# Patient Record
Sex: FEMALE | Race: WHITE | Employment: OTHER | URBAN - METROPOLITAN AREA
[De-identification: names, ages, dates, MRNs, and addresses within clinical notes are randomized per-mention and may not be internally consistent; named-entity substitution may affect disease eponyms.]

---

## 2017-01-12 ENCOUNTER — OFFICE VISIT (OUTPATIENT)
Dept: HEMATOLOGY/ONCOLOGY | Facility: HOSPITAL | Age: 52
End: 2017-01-12
Attending: SPECIALIST
Payer: COMMERCIAL

## 2017-01-12 VITALS
BODY MASS INDEX: 26.09 KG/M2 | HEART RATE: 91 BPM | OXYGEN SATURATION: 99 % | TEMPERATURE: 98 F | HEIGHT: 66 IN | WEIGHT: 162.38 LBS | RESPIRATION RATE: 18 BRPM | SYSTOLIC BLOOD PRESSURE: 137 MMHG | DIASTOLIC BLOOD PRESSURE: 84 MMHG

## 2017-01-12 DIAGNOSIS — C50.311 BREAST CANCER OF LOWER-INNER QUADRANT OF RIGHT FEMALE BREAST (HCC): Primary | ICD-10-CM

## 2017-01-12 DIAGNOSIS — D05.12 DUCTAL CARCINOMA IN SITU (DCIS) OF LEFT BREAST: ICD-10-CM

## 2017-01-12 PROCEDURE — 99215 OFFICE O/P EST HI 40 MIN: CPT | Performed by: SPECIALIST

## 2017-01-12 NOTE — PROGRESS NOTES
[facility] Progress Note      Patient Name: Kory Marroquin   YOB: 1965  Medical Record Number: [id number]  Attending Physician: Yahir Moore M.D.      Date of Visit: 1/12/2017      Chief Complaint  Breast cancer, right sided of depression in preparation for therapy with tamoxifen which can worsen depression. On 08/15/2014 she began adjuvant endocrine therapy with tamoxifen.  In 06/2015 patient discontinued tamoxifen for complaints of muscle/joint pains and mental \"fogginess mouth daily. Disp:  Rfl:         Allergies   Ms. Tawanna Apodaca has No Known Allergies. Review of Systems   Constitutional  No fevers, chills, night sweats, excessive fatigue or weight loss. Endocrine  No hot flashes.   Respiratory  No dyspnea, cough, hem suggest starting with tamoxifen as any experienced adverse effects would quickly be reversed with discontinuation of the medication. Patient stated that she was not interested.     While patient's family history is not suspicious for a genetic breast cancer

## 2017-01-12 NOTE — PROGRESS NOTES
Referring Provider: Kamille Aguila MD    Additional Providers: Saúl Purdy MD    Reason for Referral:  Arline Hodgkin was referred for genetic counseling because of a personal history of bilateral breast cancer.   Ms. Olayinka Taveras is a 46year-old woman of multiple primary cancers in the some individual, or the same type of cancer or related cancers (e.g., breast and ovarian, colorectal and endometrial) in three or more individuals in the same lineage.   Mutations in the genes, BRCA1 and BRCA2, account for th and choices regarding surveillance, screening, and prevention. It was stressed that genetic testing detects the presence of a gene mutation, it does not detect the presence of cancer or predict when, or if, an individual will develop cancer.   If she were mammograms and MRI, ovarian cancer screening, chemoprevention, and prophylactic surgery. Men with a BRCA1/2 mutation should discuss clinical breast exams, digital rectal exam and prostate specific antigen screening with their physicians.  All medical manag

## 2017-01-12 NOTE — PROGRESS NOTES
Patient is here today for follow up with [facility] for breast cancer. Patient denies pain. Medication list and medical history were reviewed and updated.     Education Record    Learner:  Patient    Disease / Diagnosis: breast cancer    Barriers / Obed Hash

## 2017-01-23 PROBLEM — D05.12 DUCTAL CARCINOMA IN SITU (DCIS) OF LEFT BREAST: Status: ACTIVE | Noted: 2017-01-23

## 2017-01-27 PROCEDURE — 88305 TISSUE EXAM BY PATHOLOGIST: CPT | Performed by: INTERNAL MEDICINE

## 2017-01-31 ENCOUNTER — ANESTHESIA EVENT (OUTPATIENT)
Dept: SURGERY | Facility: HOSPITAL | Age: 52
DRG: 583 | End: 2017-01-31
Payer: COMMERCIAL

## 2017-02-15 ENCOUNTER — SURGERY (OUTPATIENT)
Age: 52
End: 2017-02-15

## 2017-02-15 ENCOUNTER — APPOINTMENT (OUTPATIENT)
Dept: GENERAL RADIOLOGY | Facility: HOSPITAL | Age: 52
DRG: 583 | End: 2017-02-15
Attending: PLASTIC SURGERY
Payer: COMMERCIAL

## 2017-02-15 ENCOUNTER — HOSPITAL ENCOUNTER (INPATIENT)
Facility: HOSPITAL | Age: 52
LOS: 3 days | Discharge: HOME | DRG: 583 | End: 2017-02-18
Attending: SURGERY | Admitting: SURGERY
Payer: COMMERCIAL

## 2017-02-15 ENCOUNTER — ANESTHESIA (OUTPATIENT)
Dept: SURGERY | Facility: HOSPITAL | Age: 52
DRG: 583 | End: 2017-02-15
Payer: COMMERCIAL

## 2017-02-15 PROBLEM — C50.919 BREAST CANCER (HCC): Status: ACTIVE | Noted: 2017-02-15

## 2017-02-15 LAB
POCT LOT NUMBER: NORMAL
POCT URINE PREGNANCY: NEGATIVE

## 2017-02-15 PROCEDURE — 71010 XR CHEST AP PORTABLE  (CPT=71010): CPT

## 2017-02-15 PROCEDURE — 88304 TISSUE EXAM BY PATHOLOGIST: CPT | Performed by: SURGERY

## 2017-02-15 PROCEDURE — P9045 ALBUMIN (HUMAN), 5%, 250 ML: HCPCS

## 2017-02-15 PROCEDURE — 0HHV0NZ INSERTION OF TISSUE EXPANDER INTO BILATERAL BREAST, OPEN APPROACH: ICD-10-PCS | Performed by: PLASTIC SURGERY

## 2017-02-15 PROCEDURE — 88307 TISSUE EXAM BY PATHOLOGIST: CPT | Performed by: SURGERY

## 2017-02-15 PROCEDURE — 81025 URINE PREGNANCY TEST: CPT | Performed by: SURGERY

## 2017-02-15 PROCEDURE — 0KXF0ZZ TRANSFER RIGHT TRUNK MUSCLE, OPEN APPROACH: ICD-10-PCS | Performed by: PLASTIC SURGERY

## 2017-02-15 PROCEDURE — 0HTV0ZZ RESECTION OF BILATERAL BREAST, OPEN APPROACH: ICD-10-PCS | Performed by: SURGERY

## 2017-02-15 PROCEDURE — 0HRT075 REPLACEMENT OF RIGHT BREAST USING LATISSIMUS DORSI MYOCUTANEOUS FLAP, OPEN APPROACH: ICD-10-PCS | Performed by: PLASTIC SURGERY

## 2017-02-15 DEVICE — GRAFT ALLODERM CONTOUR MED PRF: Type: IMPLANTABLE DEVICE | Site: BREAST | Status: FUNCTIONAL

## 2017-02-15 DEVICE — IMPLANTABLE DEVICE: Type: IMPLANTABLE DEVICE | Site: BREAST | Status: FUNCTIONAL

## 2017-02-15 RX ORDER — KETOROLAC TROMETHAMINE 15 MG/ML
15 INJECTION, SOLUTION INTRAMUSCULAR; INTRAVENOUS EVERY 6 HOURS PRN
Status: ACTIVE | OUTPATIENT
Start: 2017-02-15 | End: 2017-02-17

## 2017-02-15 RX ORDER — HEPARIN SODIUM 5000 [USP'U]/ML
5000 INJECTION, SOLUTION INTRAVENOUS; SUBCUTANEOUS EVERY 8 HOURS
Status: DISCONTINUED | OUTPATIENT
Start: 2017-02-15 | End: 2017-02-18

## 2017-02-15 RX ORDER — OXYCODONE HYDROCHLORIDE 5 MG/1
5 TABLET ORAL EVERY 4 HOURS PRN
Status: DISCONTINUED | OUTPATIENT
Start: 2017-02-15 | End: 2017-02-18

## 2017-02-15 RX ORDER — ROSUVASTATIN CALCIUM 5 MG/1
5 TABLET, COATED ORAL NIGHTLY
Status: DISCONTINUED | OUTPATIENT
Start: 2017-02-15 | End: 2017-02-18

## 2017-02-15 RX ORDER — HYDROMORPHONE HYDROCHLORIDE 1 MG/ML
0.5 INJECTION, SOLUTION INTRAMUSCULAR; INTRAVENOUS; SUBCUTANEOUS EVERY 2 HOUR PRN
Status: DISCONTINUED | OUTPATIENT
Start: 2017-02-15 | End: 2017-02-18

## 2017-02-15 RX ORDER — SODIUM CHLORIDE, SODIUM LACTATE, POTASSIUM CHLORIDE, CALCIUM CHLORIDE 600; 310; 30; 20 MG/100ML; MG/100ML; MG/100ML; MG/100ML
INJECTION, SOLUTION INTRAVENOUS CONTINUOUS
Status: DISCONTINUED | OUTPATIENT
Start: 2017-02-15 | End: 2017-02-16

## 2017-02-15 RX ORDER — ACETAMINOPHEN 10 MG/ML
INJECTION, SOLUTION INTRAVENOUS
Status: DISCONTINUED | OUTPATIENT
Start: 2017-02-15 | End: 2017-02-15 | Stop reason: HOSPADM

## 2017-02-15 RX ORDER — BUPIVACAINE HYDROCHLORIDE 5 MG/ML
INJECTION, SOLUTION EPIDURAL; INTRACAUDAL AS NEEDED
Status: DISCONTINUED | OUTPATIENT
Start: 2017-02-15 | End: 2017-02-15 | Stop reason: HOSPADM

## 2017-02-15 RX ORDER — MIDAZOLAM HYDROCHLORIDE 1 MG/ML
1 INJECTION INTRAMUSCULAR; INTRAVENOUS ONCE
Status: DISCONTINUED | OUTPATIENT
Start: 2017-02-15 | End: 2017-02-15 | Stop reason: HOSPADM

## 2017-02-15 RX ORDER — SCOLOPAMINE TRANSDERMAL SYSTEM 1 MG/1
1 PATCH, EXTENDED RELEASE TRANSDERMAL ONCE
Status: DISCONTINUED | OUTPATIENT
Start: 2017-02-15 | End: 2017-02-15

## 2017-02-15 RX ORDER — SCOLOPAMINE TRANSDERMAL SYSTEM 1 MG/1
PATCH, EXTENDED RELEASE TRANSDERMAL
Status: DISPENSED
Start: 2017-02-15 | End: 2017-02-15

## 2017-02-15 RX ORDER — METOCLOPRAMIDE HYDROCHLORIDE 5 MG/ML
10 INJECTION INTRAMUSCULAR; INTRAVENOUS AS NEEDED
Status: DISCONTINUED | OUTPATIENT
Start: 2017-02-15 | End: 2017-02-15 | Stop reason: HOSPADM

## 2017-02-15 RX ORDER — ONDANSETRON 2 MG/ML
4 INJECTION INTRAMUSCULAR; INTRAVENOUS EVERY 6 HOURS PRN
Status: DISCONTINUED | OUTPATIENT
Start: 2017-02-15 | End: 2017-02-15 | Stop reason: HOSPADM

## 2017-02-15 RX ORDER — MEPERIDINE HYDROCHLORIDE 25 MG/ML
12.5 INJECTION INTRAMUSCULAR; INTRAVENOUS; SUBCUTANEOUS AS NEEDED
Status: DISCONTINUED | OUTPATIENT
Start: 2017-02-15 | End: 2017-02-15 | Stop reason: HOSPADM

## 2017-02-15 RX ORDER — ACETAMINOPHEN 325 MG/1
650 TABLET ORAL EVERY 6 HOURS
Status: DISCONTINUED | OUTPATIENT
Start: 2017-02-15 | End: 2017-02-15

## 2017-02-15 RX ORDER — ONDANSETRON 2 MG/ML
4 INJECTION INTRAMUSCULAR; INTRAVENOUS EVERY 6 HOURS PRN
Status: ACTIVE | OUTPATIENT
Start: 2017-02-15 | End: 2017-02-17

## 2017-02-15 RX ORDER — HYDROMORPHONE HYDROCHLORIDE 1 MG/ML
1 INJECTION, SOLUTION INTRAMUSCULAR; INTRAVENOUS; SUBCUTANEOUS EVERY 2 HOUR PRN
Status: DISCONTINUED | OUTPATIENT
Start: 2017-02-15 | End: 2017-02-18

## 2017-02-15 RX ORDER — DEXTROSE, SODIUM CHLORIDE, SODIUM LACTATE, POTASSIUM CHLORIDE, AND CALCIUM CHLORIDE 5; .6; .31; .03; .02 G/100ML; G/100ML; G/100ML; G/100ML; G/100ML
INJECTION, SOLUTION INTRAVENOUS CONTINUOUS
Status: DISCONTINUED | OUTPATIENT
Start: 2017-02-15 | End: 2017-02-16

## 2017-02-15 RX ORDER — MIDAZOLAM HYDROCHLORIDE 5 MG/ML
INJECTION INTRAMUSCULAR; INTRAVENOUS
Status: DISCONTINUED
Start: 2017-02-15 | End: 2017-02-15 | Stop reason: WASHOUT

## 2017-02-15 RX ORDER — ACETAMINOPHEN 325 MG/1
650 TABLET ORAL EVERY 4 HOURS PRN
Status: DISCONTINUED | OUTPATIENT
Start: 2017-02-15 | End: 2017-02-18

## 2017-02-15 RX ORDER — LEVOTHYROXINE SODIUM 88 UG/1
88 TABLET ORAL DAILY
Status: DISCONTINUED | OUTPATIENT
Start: 2017-02-16 | End: 2017-02-18

## 2017-02-15 RX ORDER — NALOXONE HYDROCHLORIDE 0.4 MG/ML
80 INJECTION, SOLUTION INTRAMUSCULAR; INTRAVENOUS; SUBCUTANEOUS AS NEEDED
Status: DISCONTINUED | OUTPATIENT
Start: 2017-02-15 | End: 2017-02-15 | Stop reason: HOSPADM

## 2017-02-15 RX ORDER — SCOLOPAMINE TRANSDERMAL SYSTEM 1 MG/1
1 PATCH, EXTENDED RELEASE TRANSDERMAL
Status: DISCONTINUED | OUTPATIENT
Start: 2017-02-15 | End: 2017-02-15

## 2017-02-15 RX ORDER — KETOROLAC TROMETHAMINE 30 MG/ML
30 INJECTION, SOLUTION INTRAMUSCULAR; INTRAVENOUS EVERY 6 HOURS PRN
Status: DISPENSED | OUTPATIENT
Start: 2017-02-15 | End: 2017-02-17

## 2017-02-15 RX ORDER — DOCUSATE SODIUM 100 MG/1
100 CAPSULE, LIQUID FILLED ORAL DAILY
Status: DISCONTINUED | OUTPATIENT
Start: 2017-02-15 | End: 2017-02-18

## 2017-02-15 RX ORDER — PAROXETINE HYDROCHLORIDE 20 MG/1
20 TABLET, FILM COATED ORAL DAILY
Status: DISCONTINUED | OUTPATIENT
Start: 2017-02-16 | End: 2017-02-18

## 2017-02-15 RX ORDER — OXYCODONE HYDROCHLORIDE 5 MG/1
10 TABLET ORAL EVERY 4 HOURS PRN
Status: DISCONTINUED | OUTPATIENT
Start: 2017-02-15 | End: 2017-02-18

## 2017-02-15 RX ORDER — HYDROMORPHONE HYDROCHLORIDE 1 MG/ML
0.5 INJECTION, SOLUTION INTRAMUSCULAR; INTRAVENOUS; SUBCUTANEOUS EVERY 5 MIN PRN
Status: DISCONTINUED | OUTPATIENT
Start: 2017-02-15 | End: 2017-02-15 | Stop reason: HOSPADM

## 2017-02-15 RX ORDER — METOCLOPRAMIDE HYDROCHLORIDE 5 MG/ML
10 INJECTION INTRAMUSCULAR; INTRAVENOUS EVERY 6 HOURS PRN
Status: DISCONTINUED | OUTPATIENT
Start: 2017-02-15 | End: 2017-02-18

## 2017-02-15 RX ORDER — ONDANSETRON 2 MG/ML
4 INJECTION INTRAMUSCULAR; INTRAVENOUS AS NEEDED
Status: DISCONTINUED | OUTPATIENT
Start: 2017-02-15 | End: 2017-02-15 | Stop reason: HOSPADM

## 2017-02-15 RX ORDER — HYDROMORPHONE HYDROCHLORIDE 1 MG/ML
INJECTION, SOLUTION INTRAMUSCULAR; INTRAVENOUS; SUBCUTANEOUS
Status: COMPLETED
Start: 2017-02-15 | End: 2017-02-15

## 2017-02-15 RX ORDER — DIPHENHYDRAMINE HYDROCHLORIDE 50 MG/ML
12.5 INJECTION INTRAMUSCULAR; INTRAVENOUS AS NEEDED
Status: DISCONTINUED | OUTPATIENT
Start: 2017-02-15 | End: 2017-02-15 | Stop reason: HOSPADM

## 2017-02-15 RX ORDER — CEFAZOLIN SODIUM 1 G/3ML
INJECTION, POWDER, FOR SOLUTION INTRAMUSCULAR; INTRAVENOUS
Status: DISCONTINUED | OUTPATIENT
Start: 2017-02-15 | End: 2017-02-15

## 2017-02-15 RX ORDER — SODIUM CHLORIDE, SODIUM LACTATE, POTASSIUM CHLORIDE, CALCIUM CHLORIDE 600; 310; 30; 20 MG/100ML; MG/100ML; MG/100ML; MG/100ML
INJECTION, SOLUTION INTRAVENOUS CONTINUOUS
Status: DISCONTINUED | OUTPATIENT
Start: 2017-02-15 | End: 2017-02-15

## 2017-02-15 NOTE — BRIEF OP NOTE
[facility] SURGERY  Brief Op Note     [facility] Location: OR   CSN [id number] MRN [id number]   Admission Date 2/15/2017 Operation Date 2/15/2017   Attending Physician Satinder Duval MD Operating Physician Elizabeth Gayle MD       Pre-Operative Ishan Felix

## 2017-02-15 NOTE — OPERATIVE REPORT
PREOPERATIVE DIAGNOSIS:   Bilateral breast malignancy requiring mastectomy. POSTOPERATIVE DIAGNOSIS:   Same   OPERATION/PROCEDURE:   Immediate reconstruction of right breast with latissimus dorsi myocutaneous flap and  placement of expander.    Immediate Cedric performed bilateral mastectomies which will be dictated as a separate operative procedure. Upon Dr. Pulido Ba completion, I entered the operating room and began dissecting the left subpectoral pocket.  The subpectoral pocket was dissected sharply with e the prone position. The back was prepped and draped in the usual sterile fashion. We started the elevation of the flap by incision of the skin paddle on the back, taken down through the subcutaneous tissue to the level of the deep fat overlying the muscle. the sub-latissimus plane in adequate orientation and location. No saline was introduced. A 15 Nigerien mateo drain was introduced. Then, the incisions were closed in layers with 3-0 monocryl deep dermal sutures and 4-0 monocryl subcuticular sutures.   Sterile

## 2017-02-15 NOTE — ANESTHESIA PREPROCEDURE EVALUATION
PRE-OP EVALUATION    Patient Name: Angelique Zurita    Pre-op Diagnosis: HX OF BREAST CANCER, LEFT BREAST DUCTAL CARCINOMA IN SITU     Procedure(s):  BILATERAL MASTECTOMY (GREEN)  RIGHT BREAST TISSUE EXPANDER PLACEMENT AND LATISSIMUS FLAP, LEFT BREAST T MCG Oral Tab Take 88 mcg by mouth daily. Disp:  Rfl:        Allergies: Review of patient's allergies indicates no known allergies. Anesthesia Evaluation    Patient summary reviewed.     Anesthetic Complications  (-) history of anesthetic complications Comment Procedure: CERVICAL FACET INJECTION;  Surgeon: Annika Brown MD;  Location: [facility] MAIN OR     Right 2/9/2016    Comment Procedure: CERVICAL FACET INJECTION;  Surgeon: Annika Brown MD;  Location:  MAIN OR        Smoking status: Former Smoker

## 2017-02-15 NOTE — H&P
Pre-op Diagnosis: HX OF BREAST CANCER, LEFT BREAST DUCTAL CARCINOMA IN SITU     The above referenced H&P was reviewed by Edward Correa MD on 2/15/2017, the patient was examined and no significant changes have occurred in the patient's condition since the H

## 2017-02-15 NOTE — OPERATIVE REPORT
[facility]    PATIENT'S NAME: Sunshine Almeida   ATTENDING PHYSICIAN: Melita Kay M.D. OPERATING PHYSICIAN: Melita Kay M.D.    PATIENT ACCOUNT#:   [de-identified]    LOCATION:  OR  OR [place] ROOMS 4 EDWP 1000  MEDICAL RECORD #:   [id number]       DATE Michael Rosales with electrocautery and the LigaSure device. Once hemostasis was noted, a saline-soaked laparotomy pad was placed in the wound, and the right breast was done in a similar fashion.     Once the mastectomies were completed, the procedure was turned over to D

## 2017-02-15 NOTE — H&P
HPI:    Fernando Orozco is a 46year old female who presents for evaluation of DCIS left breast. Patient recently underwent resection of atypical ductal hyperplasia of her left breast with a focus of DCIS noted on pathology.  Margins were uni N/A  10/15/2015      Comment  Procedure: CERVICAL EPIDURAL;  Surgeon: Indigo Vines MD;  Location: [address] [place] [address] BY [facility] [address] [facility] [address]  [facility] 5+ YR  N/A  10/15/2015      Comment  Procedure: CERVICAL EPIDURAL;  Surgeon: Phan Crockett •  Diabetes  Father      •  Other[other] [OTHER]  Mother          COPD -  from pneumonia    •  Hypertension  Mother      •  Breast Cancer  Self  49    •  Other[other] [OTHER]  Sister            Smoking Status: Former Smoker                   Packs/Da

## 2017-02-15 NOTE — H&P
History of present illness: Ana Luisa Laura is a 46year old patient was recently diagnosed with Left DCIS. She has had a history of right breast cancer s/p lumpectomy and radiation in 2014.  She is opting for bilateral mastectomy with no future chemoth 14'      Comment  invasive ductal carcinoma      LUMPECTOMY RIGHT    4/14'      Comment  invasive ductal carcinoma      JOSEY NEEDLE LOCALIZATION W/ SPECIMEN 1 SITE LEFT    4/14'      Comment  ADH      REDUCTION LEFT    05'      REDUCTION RIGHT    05'        Years: 14         Quit date: 01/01/2008    Alcohol Use: Yes           0.0 oz/week       0 Standard drinks or equivalent per week       Comment: social    Physical Exam: There were no vitals filed for this visit.    There is no weight on file to calculate B healing, implant infection, implant exposure and poor cosmetic result. She understands that implants are not man made devices that requires monitoring with MRI 3 years after and every other year after that.  She understands that breast reconstruction is a m

## 2017-02-16 LAB
BUN BLD-MCNC: 17 MG/DL (ref 8–20)
CALCIUM BLD-MCNC: 8.6 MG/DL (ref 8.3–10.3)
CHLORIDE: 106 MMOL/L (ref 101–111)
CO2: 26 MMOL/L (ref 22–32)
CREAT BLD-MCNC: 0.84 MG/DL (ref 0.55–1.02)
ERYTHROCYTE [DISTWIDTH] IN BLOOD BY AUTOMATED COUNT: 13.8 % (ref 11.5–16)
GLUCOSE BLD-MCNC: 114 MG/DL (ref 70–99)
HCT VFR BLD AUTO: 31.6 % (ref 34–50)
HGB BLD-MCNC: 10.2 G/DL (ref 12–16)
MCH RBC QN AUTO: 31.5 PG (ref 27–33.2)
MCHC RBC AUTO-ENTMCNC: 32.3 G/DL (ref 31–37)
MCV RBC AUTO: 97.5 FL (ref 81–100)
PLATELET # BLD AUTO: 231 10(3)UL (ref 150–450)
POTASSIUM SERPL-SCNC: 4.3 MMOL/L (ref 3.6–5.1)
RBC # BLD AUTO: 3.24 X10(6)UL (ref 3.8–5.1)
RED CELL DISTRIBUTION WIDTH-SD: 49.5 FL (ref 35.1–46.3)
SODIUM SERPL-SCNC: 140 MMOL/L (ref 136–144)
WBC # BLD AUTO: 14.2 X10(3) UL (ref 4–13)

## 2017-02-16 PROCEDURE — 36415 COLL VENOUS BLD VENIPUNCTURE: CPT | Performed by: PLASTIC SURGERY

## 2017-02-16 PROCEDURE — 80048 BASIC METABOLIC PNL TOTAL CA: CPT | Performed by: PLASTIC SURGERY

## 2017-02-16 PROCEDURE — 85027 COMPLETE CBC AUTOMATED: CPT | Performed by: PLASTIC SURGERY

## 2017-02-16 NOTE — PLAN OF CARE
GENITOURINARY - ADULT    • Absence of urinary retention Progressing        PAIN - ADULT    • Verbalizes/displays adequate comfort level or patient's stated pain goal Progressing        RESPIRATORY - ADULT    • Achieves optimal ventilation and oxygenation P

## 2017-02-16 NOTE — PROGRESS NOTES
Plastic Surgery PN    No issues o/n  Pain controlled with IV pain meds  Tolerating breakfast  Able to use the bathroom      02/16/17  0819   BP: 95/65   Pulse: 91   Temp: 98.6 °F (37 °C)   Resp: 18     ALEXANDRA: L Breast: 60, R breast: 65,  Back: 115, 80 serosan

## 2017-02-16 NOTE — PROGRESS NOTES
Plastic Surgery PN    Patient is s/p bilateral mastectomy with R latissimus flap with TE and L TE reconstruction    A/P:  NO pressure in the right axillary area  Drain care  Pain control with PO oxycodone and IV breakthrough   Advance diet as tolerated  An

## 2017-02-16 NOTE — PROGRESS NOTES
[facility]  Progress Note    Magno Carr Patient Status:  Outpatient in a Bed    1965 MRN [id number]   [facility] 3NW-A Attending Maxime Mccann MD   Hosp Day # 1 PCP Janie Giraldo MD     Subjective:    Patient reports in plastics      D/W Dr Sumit Perry, [address]  General Surgery  2/16/2017

## 2017-02-16 NOTE — PAYOR COMM NOTE
Attending Physician: Khadra Farmer MD    Review Type: ADMISSION   Reviewer: Amanda Ratliff       Date: February 16, 2017 - 9:34 AM  Payor: [employer] ARNOLD POS/KASSI  Authorization Number: REF #[id number]  Admit date: 2/15/2017  8:56 AM       Direct for OR    Pre-Ope Dieter Guy RN      Heparin Sodium (Porcine) 5000 UNIT/ML injection 5,000 Units     Date Action Dose Route User    2/16/2017 0608 Given 5000 Units Subcutaneous (Right Lower Abdomen) Dieter Guy RN    2/15/2017 2220 Given 5000 Units [facility] 24HRS:  Labs Reviewed   BASIC METABOLIC PANEL (8) - Abnormal; Notable for the following:     Glucose 114 (*)     All other components within normal limits   CBC, PLATELET; NO DIFFERENTIAL - Abnormal; Notable for the following:     WBC 14.2 (*)     RBC 3.24

## 2017-02-16 NOTE — ANESTHESIA POSTPROCEDURE EVALUATION
[address] [facility] Patient Status:  Outpatient in a Bed   Age/Gender 46year old female MRN [id number]   Location [facility] Attending Remigio Bergman MD   Hosp Day # 0 PCP Adrian Pal MD       Anesthesia

## 2017-02-16 NOTE — BRIEF OP NOTE
[facility] UNIT  Brief Op Note     Ky Sour Location: OR   CSN [id number] MRN [id number]   Admission Date 2/15/2017 Operation Date 2/15/2017   Attending Physician Miguel Will MD Operating Physician Heidi Wilson MD

## 2017-02-17 RX ORDER — CEFADROXIL 500 MG/1
500 CAPSULE ORAL 2 TIMES DAILY
Qty: 14 CAPSULE | Refills: 0 | Status: SHIPPED | OUTPATIENT
Start: 2017-02-17 | End: 2017-02-27

## 2017-02-17 RX ORDER — PSEUDOEPHEDRINE HCL 30 MG
100 TABLET ORAL DAILY
Qty: 20 CAPSULE | Refills: 0 | Status: SHIPPED | OUTPATIENT
Start: 2017-02-17 | End: 2017-06-14 | Stop reason: ALTCHOICE

## 2017-02-17 RX ORDER — OXYCODONE HYDROCHLORIDE AND ACETAMINOPHEN 5; 325 MG/1; MG/1
2 TABLET ORAL EVERY 4 HOURS PRN
Qty: 40 TABLET | Refills: 1 | Status: SHIPPED | OUTPATIENT
Start: 2017-02-17 | End: 2017-02-24

## 2017-02-17 NOTE — PROGRESS NOTES
Plastic Surgery PN    No issues o/n  Pain controlled with PO meds  Tolerating breakfast  Able to use the bathroom   Ambulated without difficulty       02/17/17  1156   BP: 103/61   Pulse: 70   Temp: 98.2 °F (36.8 °C)   Resp: 16     UOP:650 cc  ALEXANDRA:     ALEXANDRA:

## 2017-02-17 NOTE — PLAN OF CARE
PAIN - ADULT    • Verbalizes/displays adequate comfort level or patient's stated pain goal Not Progressing          DISCHARGE PLANNING    • Discharge to home or other facility with appropriate resources Progressing        GENITOURINARY - ADULT    • Absence

## 2017-02-17 NOTE — PROGRESS NOTES
Dr Hubbard Coventry here to see pt.  md states ok to d/c home TOMORROW & she does not need to see pt prior to d/c.

## 2017-02-17 NOTE — PROGRESS NOTES
[facility]  Progress Note    Guillermina Backers Patient Status:  Observation    1965 MRN [id number]   [facility] 3NW-A Attending Arabella Ayala MD   Hosp Day # 2 PCP Kaylin Stoddard MD     Subjective:    Patient continues to repor

## 2017-02-17 NOTE — PLAN OF CARE
DISCHARGE PLANNING    • Discharge to home or other facility with appropriate resources Progressing        GENITOURINARY - ADULT    • Absence of urinary retention Progressing        PAIN - ADULT    • Verbalizes/displays adequate comfort level or patient's s

## 2017-02-17 NOTE — PLAN OF CARE
Problem: RESPIRATORY - ADULT  Goal: Achieves optimal ventilation and oxygenation  INTERVENTIONS:  - Assess for changes in respiratory status  - Assess for changes in mentation and behavior  - Position to facilitate oxygenation and minimize respiratory effo request assistance  - Assess pain using appropriate pain scale  - Administer analgesics based on type and severity of pain and evaluate response  - Implement non-pharmacological measures as appropriate and evaluate response  - Consider cultural and social

## 2017-02-18 VITALS
HEART RATE: 75 BPM | BODY MASS INDEX: 26.03 KG/M2 | DIASTOLIC BLOOD PRESSURE: 63 MMHG | RESPIRATION RATE: 16 BRPM | WEIGHT: 162 LBS | OXYGEN SATURATION: 100 % | TEMPERATURE: 98 F | HEIGHT: 66 IN | SYSTOLIC BLOOD PRESSURE: 117 MMHG

## 2017-02-18 RX ORDER — HYDROCODONE BITARTRATE AND ACETAMINOPHEN 5; 325 MG/1; MG/1
1-2 TABLET ORAL
Qty: 40 TABLET | Refills: 0 | Status: SHIPPED | OUTPATIENT
Start: 2017-02-18 | End: 2017-02-18

## 2017-02-18 NOTE — PLAN OF CARE
Problem: RESPIRATORY - ADULT  Goal: Achieves optimal ventilation and oxygenation  INTERVENTIONS:  - Assess for changes in respiratory status  - Assess for changes in mentation and behavior  - Position to facilitate oxygenation and minimize respiratory effo PAIN - ADULT  Goal: Verbalizes/displays adequate comfort level or patient’s stated pain goal  INTERVENTIONS:  - Encourage pt to monitor pain and request assistance  - Assess pain using appropriate pain scale  - Administer analgesics based on type and sever

## 2017-02-18 NOTE — PLAN OF CARE
PT A/O, 94% ON RA, DRESSING TO CHEST D/I, ALEXANDRA X 4 DRAINING SEROUSANGUINAOUS DRAINAGE, C/O OF SEVERE PAIN, TEARFUL, GIVEN OXY IR/DILAUDID WITH PARTIAL RELIEF, USING IS, SCDS ON, IV ANTIBIOTICS GIVEN, UP TO BATHROOM VOIDING. INSTRUCTED PT ON POC.    PAIN - DAVID

## 2017-02-18 NOTE — PROGRESS NOTES
rx for percocet writted by dr Sisi Perez noted on chart. rx for norco writted by dr johnston d/c. Pt states she is ready to go home.

## 2017-02-18 NOTE — PROGRESS NOTES
Dr johnston here & states ok to d/c home. md asked this rn to check in with dr Issa Yeager & update on poc & d/c plans.   Awaiting return call from dr Issa Yeager

## 2017-02-18 NOTE — PROGRESS NOTES
[facility]  Progress Note    Nery Martinez Patient Status:  Observation    1965 MRN [id number]   [facility] 3NW-A Attending Jessie Duggan MD   Hosp Day # 3 PCP Binh Long MD     Subjective:  Feels well, ready to go home

## 2017-02-18 NOTE — PROGRESS NOTES
Pt d/c home. D/c instructions given to pt including sydney drain care, surgical wound care, rx's for abx & percocet, review of all home meds, diet, hydration, activity restrictions & f/u care. Verbalized understanding. Left unit stable via w/c.

## 2017-02-20 NOTE — DISCHARGE SUMMARY
[facility]  Discharge Summary    Mayo Chapman Patient Status:  Observation    1965 MRN [id number]   [facility] 3NW-A Attending No att. providers found   Hosp Day # 3 PCP Sami Porter MD     Date of Admission: 2/15/2017 Normal, Disp-90 tablet, R-3    Omega-3 Fatty Acids (FISH OIL CONCENTRATE OR)  Take 1 tablet by mouth., Historical    Cholecalciferol (VITAMIN D3) 5000 units Oral Cap  Take 1 Units by mouth., Historical    Ascorbic Acid (VITAMIN C) 500 MG/5ML Oral Liquid  T

## 2017-02-23 NOTE — PAYOR COMM NOTE
Review Type: CONTINUED STAY  Reviewer: Judyann Dance     Date: February 23, 2017 - 2:26 PM  Payor: [facility] [employer] ARNOLD POS/KASSI  Authorization Number: [id number]  Admit date: 2/15/2017  8:56 AM        Date of Discharge: 2/18/2017 12:43 PM    Admitting Diagnosis: HX c/d/i. Dressing in place.  No fluid collection.      A/P: POD 2 s/p bilateral mastectomy and R breast Lat flap/TE and L breast TE  - ambulate    - drain teaching  - IS  - Plan to DC home tomorrow am  - Follow up with plastic surgery on Tuesday and Dr. Karla Angel

## 2017-06-19 ENCOUNTER — APPOINTMENT (OUTPATIENT)
Dept: LAB | Facility: HOSPITAL | Age: 52
End: 2017-06-19
Payer: COMMERCIAL

## 2017-06-19 DIAGNOSIS — Z85.3 PERSONAL HISTORY OF BREAST CANCER: ICD-10-CM

## 2017-06-19 PROCEDURE — 36415 COLL VENOUS BLD VENIPUNCTURE: CPT

## 2017-06-19 PROCEDURE — 80048 BASIC METABOLIC PNL TOTAL CA: CPT

## 2017-06-19 PROCEDURE — 93010 ELECTROCARDIOGRAM REPORT: CPT | Performed by: INTERNAL MEDICINE

## 2017-06-19 PROCEDURE — 93005 ELECTROCARDIOGRAM TRACING: CPT

## 2017-07-05 ENCOUNTER — ANESTHESIA EVENT (OUTPATIENT)
Dept: SURGERY | Facility: HOSPITAL | Age: 52
End: 2017-07-05
Payer: COMMERCIAL

## 2017-07-06 ENCOUNTER — ANESTHESIA (OUTPATIENT)
Dept: SURGERY | Facility: HOSPITAL | Age: 52
End: 2017-07-06
Payer: COMMERCIAL

## 2017-07-06 ENCOUNTER — SURGERY (OUTPATIENT)
Age: 52
End: 2017-07-06

## 2017-07-06 ENCOUNTER — HOSPITAL ENCOUNTER (OUTPATIENT)
Facility: HOSPITAL | Age: 52
Setting detail: OBSERVATION
Discharge: HOME | End: 2017-07-08
Attending: PLASTIC SURGERY | Admitting: PLASTIC SURGERY
Payer: COMMERCIAL

## 2017-07-06 DIAGNOSIS — Z85.3 PERSONAL HISTORY OF BREAST CANCER: Primary | ICD-10-CM

## 2017-07-06 LAB
POCT LOT NUMBER: NORMAL
POCT URINE PREGNANCY: NEGATIVE

## 2017-07-06 PROCEDURE — 0J083ZZ ALTERATION OF ABDOMEN SUBCUTANEOUS TISSUE AND FASCIA, PERCUTANEOUS APPROACH: ICD-10-PCS | Performed by: PLASTIC SURGERY

## 2017-07-06 PROCEDURE — 0J073ZZ ALTERATION OF BACK SUBCUTANEOUS TISSUE AND FASCIA, PERCUTANEOUS APPROACH: ICD-10-PCS | Performed by: PLASTIC SURGERY

## 2017-07-06 PROCEDURE — 0HPU0NZ REMOVAL OF TISSUE EXPANDER FROM LEFT BREAST, OPEN APPROACH: ICD-10-PCS | Performed by: PLASTIC SURGERY

## 2017-07-06 PROCEDURE — 88304 TISSUE EXAM BY PATHOLOGIST: CPT | Performed by: PLASTIC SURGERY

## 2017-07-06 PROCEDURE — 0HRV0JZ REPLACEMENT OF BILATERAL BREAST WITH SYNTHETIC SUBSTITUTE, OPEN APPROACH: ICD-10-PCS | Performed by: PLASTIC SURGERY

## 2017-07-06 PROCEDURE — 0HPT0NZ REMOVAL OF TISSUE EXPANDER FROM RIGHT BREAST, OPEN APPROACH: ICD-10-PCS | Performed by: PLASTIC SURGERY

## 2017-07-06 PROCEDURE — 81025 URINE PREGNANCY TEST: CPT | Performed by: PLASTIC SURGERY

## 2017-07-06 DEVICE — IMPLANTABLE DEVICE: Type: IMPLANTABLE DEVICE | Site: BREAST | Status: FUNCTIONAL

## 2017-07-06 RX ORDER — HYDROCODONE BITARTRATE AND ACETAMINOPHEN 5; 325 MG/1; MG/1
1 TABLET ORAL AS NEEDED
Status: DISCONTINUED | OUTPATIENT
Start: 2017-07-06 | End: 2017-07-06 | Stop reason: HOSPADM

## 2017-07-06 RX ORDER — HYDROMORPHONE HYDROCHLORIDE 1 MG/ML
INJECTION, SOLUTION INTRAMUSCULAR; INTRAVENOUS; SUBCUTANEOUS
Status: COMPLETED
Start: 2017-07-06 | End: 2017-07-06

## 2017-07-06 RX ORDER — LEVOTHYROXINE SODIUM 88 UG/1
88 TABLET ORAL
Status: DISCONTINUED | OUTPATIENT
Start: 2017-07-07 | End: 2017-07-08

## 2017-07-06 RX ORDER — BUPIVACAINE HYDROCHLORIDE AND EPINEPHRINE 5; 5 MG/ML; UG/ML
INJECTION, SOLUTION EPIDURAL; INTRACAUDAL; PERINEURAL AS NEEDED
Status: DISCONTINUED | OUTPATIENT
Start: 2017-07-06 | End: 2017-07-06 | Stop reason: HOSPADM

## 2017-07-06 RX ORDER — HYDROMORPHONE HYDROCHLORIDE 1 MG/ML
0.4 INJECTION, SOLUTION INTRAMUSCULAR; INTRAVENOUS; SUBCUTANEOUS EVERY 5 MIN PRN
Status: DISCONTINUED | OUTPATIENT
Start: 2017-07-06 | End: 2017-07-06 | Stop reason: HOSPADM

## 2017-07-06 RX ORDER — MAGNESIUM HYDROXIDE 1200 MG/15ML
LIQUID ORAL CONTINUOUS PRN
Status: DISCONTINUED | OUTPATIENT
Start: 2017-07-06 | End: 2017-07-06

## 2017-07-06 RX ORDER — DIPHENHYDRAMINE HCL 25 MG
25 CAPSULE ORAL NIGHTLY PRN
Status: DISCONTINUED | OUTPATIENT
Start: 2017-07-06 | End: 2017-07-08

## 2017-07-06 RX ORDER — DOCUSATE SODIUM 100 MG/1
100 CAPSULE, LIQUID FILLED ORAL 2 TIMES DAILY
Status: DISCONTINUED | OUTPATIENT
Start: 2017-07-06 | End: 2017-07-08

## 2017-07-06 RX ORDER — HYDROCODONE BITARTRATE AND ACETAMINOPHEN 5; 325 MG/1; MG/1
2 TABLET ORAL EVERY 4 HOURS PRN
Status: DISCONTINUED | OUTPATIENT
Start: 2017-07-06 | End: 2017-07-08

## 2017-07-06 RX ORDER — CEFAZOLIN SODIUM 1 G/3ML
INJECTION, POWDER, FOR SOLUTION INTRAMUSCULAR; INTRAVENOUS
Status: DISCONTINUED | OUTPATIENT
Start: 2017-07-06 | End: 2017-07-06 | Stop reason: HOSPADM

## 2017-07-06 RX ORDER — ONDANSETRON 2 MG/ML
4 INJECTION INTRAMUSCULAR; INTRAVENOUS EVERY 6 HOURS PRN
Status: ACTIVE | OUTPATIENT
Start: 2017-07-06 | End: 2017-07-07

## 2017-07-06 RX ORDER — ACETAMINOPHEN 325 MG/1
650 TABLET ORAL EVERY 4 HOURS PRN
Status: DISCONTINUED | OUTPATIENT
Start: 2017-07-06 | End: 2017-07-08

## 2017-07-06 RX ORDER — MIDAZOLAM HYDROCHLORIDE 1 MG/ML
1 INJECTION INTRAMUSCULAR; INTRAVENOUS EVERY 5 MIN PRN
Status: DISCONTINUED | OUTPATIENT
Start: 2017-07-06 | End: 2017-07-06 | Stop reason: HOSPADM

## 2017-07-06 RX ORDER — HYDROCODONE BITARTRATE AND ACETAMINOPHEN 5; 325 MG/1; MG/1
1 TABLET ORAL EVERY 4 HOURS PRN
Status: DISCONTINUED | OUTPATIENT
Start: 2017-07-06 | End: 2017-07-08

## 2017-07-06 RX ORDER — MEPERIDINE HYDROCHLORIDE 25 MG/ML
12.5 INJECTION INTRAMUSCULAR; INTRAVENOUS; SUBCUTANEOUS AS NEEDED
Status: DISCONTINUED | OUTPATIENT
Start: 2017-07-06 | End: 2017-07-06 | Stop reason: HOSPADM

## 2017-07-06 RX ORDER — NALOXONE HYDROCHLORIDE 0.4 MG/ML
80 INJECTION, SOLUTION INTRAMUSCULAR; INTRAVENOUS; SUBCUTANEOUS AS NEEDED
Status: DISCONTINUED | OUTPATIENT
Start: 2017-07-06 | End: 2017-07-06 | Stop reason: HOSPADM

## 2017-07-06 RX ORDER — HEPARIN SODIUM 5000 [USP'U]/ML
5000 INJECTION, SOLUTION INTRAVENOUS; SUBCUTANEOUS EVERY 12 HOURS SCHEDULED
Status: DISCONTINUED | OUTPATIENT
Start: 2017-07-06 | End: 2017-07-08

## 2017-07-06 RX ORDER — HYDROMORPHONE HYDROCHLORIDE 1 MG/ML
0.4 INJECTION, SOLUTION INTRAMUSCULAR; INTRAVENOUS; SUBCUTANEOUS
Status: DISCONTINUED | OUTPATIENT
Start: 2017-07-06 | End: 2017-07-08

## 2017-07-06 RX ORDER — SODIUM CHLORIDE, SODIUM LACTATE, POTASSIUM CHLORIDE, CALCIUM CHLORIDE 600; 310; 30; 20 MG/100ML; MG/100ML; MG/100ML; MG/100ML
INJECTION, SOLUTION INTRAVENOUS CONTINUOUS
Status: DISCONTINUED | OUTPATIENT
Start: 2017-07-06 | End: 2017-07-06

## 2017-07-06 RX ORDER — SODIUM CHLORIDE, SODIUM LACTATE, POTASSIUM CHLORIDE, CALCIUM CHLORIDE 600; 310; 30; 20 MG/100ML; MG/100ML; MG/100ML; MG/100ML
INJECTION, SOLUTION INTRAVENOUS CONTINUOUS
Status: DISCONTINUED | OUTPATIENT
Start: 2017-07-06 | End: 2017-07-08

## 2017-07-06 RX ORDER — METOCLOPRAMIDE HYDROCHLORIDE 5 MG/ML
10 INJECTION INTRAMUSCULAR; INTRAVENOUS AS NEEDED
Status: DISCONTINUED | OUTPATIENT
Start: 2017-07-06 | End: 2017-07-06 | Stop reason: HOSPADM

## 2017-07-06 RX ORDER — PAROXETINE HYDROCHLORIDE 20 MG/1
20 TABLET, FILM COATED ORAL DAILY
Status: DISCONTINUED | OUTPATIENT
Start: 2017-07-06 | End: 2017-07-08

## 2017-07-06 RX ORDER — ONDANSETRON 2 MG/ML
4 INJECTION INTRAMUSCULAR; INTRAVENOUS AS NEEDED
Status: DISCONTINUED | OUTPATIENT
Start: 2017-07-06 | End: 2017-07-06 | Stop reason: HOSPADM

## 2017-07-06 RX ORDER — BISACODYL 10 MG
10 SUPPOSITORY, RECTAL RECTAL
Status: DISCONTINUED | OUTPATIENT
Start: 2017-07-06 | End: 2017-07-08

## 2017-07-06 RX ORDER — METOCLOPRAMIDE HYDROCHLORIDE 5 MG/ML
10 INJECTION INTRAMUSCULAR; INTRAVENOUS EVERY 6 HOURS PRN
Status: DISCONTINUED | OUTPATIENT
Start: 2017-07-06 | End: 2017-07-08

## 2017-07-06 RX ORDER — HYDROCODONE BITARTRATE AND ACETAMINOPHEN 5; 325 MG/1; MG/1
2 TABLET ORAL AS NEEDED
Status: DISCONTINUED | OUTPATIENT
Start: 2017-07-06 | End: 2017-07-06 | Stop reason: HOSPADM

## 2017-07-06 NOTE — ANESTHESIA POSTPROCEDURE EVALUATION
[address] Patient Status:  Hospital Outpatient Surgery   Age/Gender 45year old female MRN [id number]   Location [address] [facility] Attending Torrey Murcia MD   [address] Day # 0 PCP Marylou Cardenas MD

## 2017-07-06 NOTE — OPERATIVE REPORT
Pre-Operative Diagnosis: ACQUIRED ABSENCE OF BILATERAL BREASTS     Post-Operative Diagnosis: ACQUIRED ABSENCE OF BILATERAL BREASTS     Procedure Performed:    Procedure(s):  BILATERAL SECOND STAGE BREAST RECONSTRUCTION WITH SILICONE IMPLANT Jessy Delgado Prior to the procedure the patient was preoperatively marked in the standing upright position. Both inframammary folds as well as the midline were marked. Contour abnormalities were marked over the left lateral breast. The areas of liposuction was marked. was performed down to the capsule, which was opened. The tissue expander was removed. A complete periprosthetic capulotomy was performed. The lateral capsule was closed with multiple interrupted 2-0 ethibond sutures.  The pocket was irrigated with copious used to dress the abdomen The patient tolerated the procedure well and there were no complications. She was discharged extubated to the recovery room in stable condition.

## 2017-07-06 NOTE — ANESTHESIA PREPROCEDURE EVALUATION
PRE-OP EVALUATION    Patient Name: Sofie Hunt    Pre-op Diagnosis: PERSONAL HISTORY OF BREAST CANCER    Procedure(s):  COSMETIC LIPOSUCTION TO ABDOMEN AND BILATERAL FLANKS, DOG EAR REVISION TO BACK, RECONSTRUCTIVE 2ND STAGE BREAST RECONSTRUCTION tolerance: good     MET: >4         (+) hyperlipidemia                                  Endo/Other    Negative endo/other ROS.       (+) hypothyroidism                       Pulmonary    Negative pulmonary ROS.                        Neuro/Psych    Negative localization  14': RADIATION RIGHT      Comment: invasive ductal carcinoma  05': REDUCTION LEFT  05': REDUCTION RIGHT  No date: TONSILLECTOMY     Smoking status: Former Smoker  1.00 Packs/day  For 15.00 Years     Quit date: 1/1/2008    Smokeless tobacco: N

## 2017-07-06 NOTE — H&P
Pre-op Diagnosis: PERSONAL HISTORY OF BREAST CANCER    The above referenced H&P was reviewed by Tyra Galeana MD on 7/6/2017, the patient was examined and no significant changes have occurred in the patient's condition since the H&P was performed.   ANDRES thomas

## 2017-07-07 RX ORDER — IBUPROFEN 600 MG/1
600 TABLET ORAL EVERY 6 HOURS PRN
Status: DISCONTINUED | OUTPATIENT
Start: 2017-07-07 | End: 2017-07-08

## 2017-07-07 NOTE — PROGRESS NOTES
Plastic Surgery PN    46 yo s/p bilateral second stage implant exchange, abdominal liposuction, fat grafting to left breast. + dizziness when ambulating. BP on the hypotensive side.  Tolerated po.      07/07/17  1233   BP: 97/56   Pulse: 71   Resp: 18   Tem

## 2017-07-07 NOTE — PLAN OF CARE
Problem: SKIN/TISSUE INTEGRITY - ADULT  Goal: Incision(s), wounds(s) or drain site(s) healing without S/S of infection  INTERVENTIONS:  - Assess and document risk factors for pressure ulcer development  - Assess and document skin integrity  - Assess and do

## 2017-07-07 NOTE — PROGRESS NOTES
NURSING ADMISSION NOTE      Patient admitted via Cart  Oriented to room. Safety precautions initiated. Bed in low position. Call light in reach. RECEIVED PT FROM PACU , ALERT AND ORIENT X 4 , ON O2 2L/NC , CPOX , ABD BINDER ON , SURGICAL BRA ON , SCD

## 2017-07-07 NOTE — PLAN OF CARE
PAIN - ADULT    • Verbalizes/displays adequate comfort level or patient's stated pain goal Progressing        SKIN/TISSUE INTEGRITY - ADULT    • Incision(s), wounds(s) or drain site(s) healing without S/S of infection Progressing        Pt taking liquids &

## 2017-07-08 VITALS
OXYGEN SATURATION: 99 % | RESPIRATION RATE: 16 BRPM | SYSTOLIC BLOOD PRESSURE: 128 MMHG | HEIGHT: 66 IN | BODY MASS INDEX: 26.75 KG/M2 | HEART RATE: 71 BPM | TEMPERATURE: 98 F | DIASTOLIC BLOOD PRESSURE: 64 MMHG | WEIGHT: 166.44 LBS

## 2017-07-08 NOTE — DISCHARGE SUMMARY
[facility]  Discharge Summary    Suze Kelly Patient Status:  Observation    1965 MRN [id number]   [facility] 3NW-A Attending Chris Cullen MD   Hosp Day # 0 PCP Olivia Cisneros MD     Date of Admission: 2017    Da Exam:    No tenderness, masses, or nipple abnormality   Abdomen:     Soft, non-tender, bowel sounds active all four quadrants,     no masses, no organomegaly   Genitalia:    Normal female without lesion, discharge or tenderness   Rectal:    Normal tone, no Tab  Take  by mouth. azithromycin (ZITHROMAX Z-TAMIKO) 250 MG Oral Tab  Take two tablets today, then one tablet daily for 4 more days  Qty: 6 tablet Refills: 0    PARoxetine HCl 20 MG Oral Tab  Take 1 tablet (20 mg total) by mouth daily.   Qty: 90 tablet Re

## 2017-07-08 NOTE — PROGRESS NOTES
Pt d/c home. D/c instructions given to pt including diet, hydration, activity, wound care & f/u care. Verbalized understanding of all instructions. Left unit stable via w/c.

## 2017-07-08 NOTE — PROGRESS NOTES
POD 2  S/p liposuction to abdomen and b/l flanks, dog ear revision to back, b/l breast impalnt exchange capsulotomies with fat grafting to L breast  Pecola Judi reports feeling well, pain controlled with PO Norco  Denies any further dizziness or lightheadednes

## 2017-07-08 NOTE — PLAN OF CARE
PAIN - ADULT    • Verbalizes/displays adequate comfort level or patient's stated pain goal Progressing        SKIN/TISSUE INTEGRITY - ADULT    • Incision(s), wounds(s) or drain site(s) healing without S/S of infection Progressing        POD # 1.   Gauze to

## 2017-07-12 NOTE — H&P
History of present illness: Nery Martinez is a 46year old patient was recently diagnosed with Left DCIS. She has had a history of right breast cancer s/p lumpectomy and radiation in 2014.  She is opting for bilateral mastectomy with no future chemoth Lumpectomy w/SLN bx; Left Exc of                ADH; all under needle localization  14': RADIATION RIGHT      Comment: invasive ductal carcinoma  05': REDUCTION LEFT  05': REDUCTION RIGHT  No date: TONSILLECTOMY    No current facility-administered medicati breast recon in place    A/P: OR for second stage recon

## 2017-11-13 ENCOUNTER — ANESTHESIA EVENT (OUTPATIENT)
Dept: SURGERY | Facility: HOSPITAL | Age: 52
End: 2017-11-13
Payer: COMMERCIAL

## 2017-11-13 ENCOUNTER — ANESTHESIA (OUTPATIENT)
Dept: SURGERY | Facility: HOSPITAL | Age: 52
End: 2017-11-13
Payer: COMMERCIAL

## 2017-11-13 ENCOUNTER — HOSPITAL ENCOUNTER (OUTPATIENT)
Facility: HOSPITAL | Age: 52
Setting detail: HOSPITAL OUTPATIENT SURGERY
Discharge: HOME | End: 2017-11-13
Attending: PLASTIC SURGERY | Admitting: PLASTIC SURGERY
Payer: COMMERCIAL

## 2017-11-13 ENCOUNTER — SURGERY (OUTPATIENT)
Age: 52
End: 2017-11-13

## 2017-11-13 VITALS
WEIGHT: 169.06 LBS | TEMPERATURE: 99 F | BODY MASS INDEX: 27.17 KG/M2 | HEIGHT: 66 IN | RESPIRATION RATE: 16 BRPM | DIASTOLIC BLOOD PRESSURE: 71 MMHG | SYSTOLIC BLOOD PRESSURE: 105 MMHG | OXYGEN SATURATION: 95 % | HEART RATE: 87 BPM

## 2017-11-13 DIAGNOSIS — Z85.3 PERSONAL HISTORY OF MALIGNANT NEOPLASM OF BREAST: ICD-10-CM

## 2017-11-13 PROCEDURE — 0HX5XZZ TRANSFER CHEST SKIN, EXTERNAL APPROACH: ICD-10-PCS | Performed by: PLASTIC SURGERY

## 2017-11-13 PROCEDURE — 0JD83ZZ EXTRACTION OF ABDOMEN SUBCUTANEOUS TISSUE AND FASCIA, PERCUTANEOUS APPROACH: ICD-10-PCS | Performed by: PLASTIC SURGERY

## 2017-11-13 PROCEDURE — 0HRV37Z REPLACEMENT OF BILATERAL BREAST WITH AUTOLOGOUS TISSUE SUBSTITUTE, PERCUTANEOUS APPROACH: ICD-10-PCS | Performed by: PLASTIC SURGERY

## 2017-11-13 PROCEDURE — 81025 URINE PREGNANCY TEST: CPT | Performed by: PLASTIC SURGERY

## 2017-11-13 RX ORDER — MIDAZOLAM HYDROCHLORIDE 1 MG/ML
1 INJECTION INTRAMUSCULAR; INTRAVENOUS EVERY 5 MIN PRN
Status: DISCONTINUED | OUTPATIENT
Start: 2017-11-13 | End: 2017-11-13

## 2017-11-13 RX ORDER — LIDOCAINE HYDROCHLORIDE AND EPINEPHRINE 10; 10 MG/ML; UG/ML
INJECTION, SOLUTION INFILTRATION; PERINEURAL AS NEEDED
Status: DISCONTINUED | OUTPATIENT
Start: 2017-11-13 | End: 2017-11-13 | Stop reason: HOSPADM

## 2017-11-13 RX ORDER — MEPERIDINE HYDROCHLORIDE 25 MG/ML
12.5 INJECTION INTRAMUSCULAR; INTRAVENOUS; SUBCUTANEOUS AS NEEDED
Status: DISCONTINUED | OUTPATIENT
Start: 2017-11-13 | End: 2017-11-13

## 2017-11-13 RX ORDER — SODIUM CHLORIDE, SODIUM LACTATE, POTASSIUM CHLORIDE, CALCIUM CHLORIDE 600; 310; 30; 20 MG/100ML; MG/100ML; MG/100ML; MG/100ML
INJECTION, SOLUTION INTRAVENOUS CONTINUOUS
Status: DISCONTINUED | OUTPATIENT
Start: 2017-11-13 | End: 2017-11-13

## 2017-11-13 RX ORDER — CEFAZOLIN SODIUM 1 G/3ML
INJECTION, POWDER, FOR SOLUTION INTRAMUSCULAR; INTRAVENOUS
Status: DISCONTINUED | OUTPATIENT
Start: 2017-11-13 | End: 2017-11-13 | Stop reason: HOSPADM

## 2017-11-13 RX ORDER — HYDROCODONE BITARTRATE AND ACETAMINOPHEN 5; 325 MG/1; MG/1
1 TABLET ORAL AS NEEDED
Status: COMPLETED | OUTPATIENT
Start: 2017-11-13 | End: 2017-11-13

## 2017-11-13 RX ORDER — HYDROMORPHONE HYDROCHLORIDE 1 MG/ML
INJECTION, SOLUTION INTRAMUSCULAR; INTRAVENOUS; SUBCUTANEOUS
Status: COMPLETED
Start: 2017-11-13 | End: 2017-11-13

## 2017-11-13 RX ORDER — METOCLOPRAMIDE HYDROCHLORIDE 5 MG/ML
10 INJECTION INTRAMUSCULAR; INTRAVENOUS AS NEEDED
Status: DISCONTINUED | OUTPATIENT
Start: 2017-11-13 | End: 2017-11-13

## 2017-11-13 RX ORDER — NALOXONE HYDROCHLORIDE 0.4 MG/ML
80 INJECTION, SOLUTION INTRAMUSCULAR; INTRAVENOUS; SUBCUTANEOUS AS NEEDED
Status: DISCONTINUED | OUTPATIENT
Start: 2017-11-13 | End: 2017-11-13

## 2017-11-13 RX ORDER — ONDANSETRON 2 MG/ML
4 INJECTION INTRAMUSCULAR; INTRAVENOUS AS NEEDED
Status: DISCONTINUED | OUTPATIENT
Start: 2017-11-13 | End: 2017-11-13

## 2017-11-13 RX ORDER — HYDROMORPHONE HYDROCHLORIDE 1 MG/ML
0.4 INJECTION, SOLUTION INTRAMUSCULAR; INTRAVENOUS; SUBCUTANEOUS EVERY 5 MIN PRN
Status: DISCONTINUED | OUTPATIENT
Start: 2017-11-13 | End: 2017-11-13

## 2017-11-13 RX ORDER — HYDROCODONE BITARTRATE AND ACETAMINOPHEN 5; 325 MG/1; MG/1
2 TABLET ORAL AS NEEDED
Status: COMPLETED | OUTPATIENT
Start: 2017-11-13 | End: 2017-11-13

## 2017-11-13 NOTE — BRIEF OP NOTE
Pre-Operative Diagnosis: Personal history of malignant neoplasm of breast [Z85.3]     Post-Operative Diagnosis: Personal history of malignant neoplasm of breast [Z85.3]     Procedure Performed:   Procedure(s):  FAT GRAFTING AND NIPPLE CREATION TO Katerina Solid

## 2017-11-13 NOTE — ANESTHESIA PREPROCEDURE EVALUATION
PRE-OP EVALUATION    Patient Name: Jonathon Ndiaye    Pre-op Diagnosis: Personal history of malignant neoplasm of breast [Z85.3]    Procedure(s):  FAT GRAFTING AND NIPPLE CREATION TO BILATERAL BREASTS      Surgeon(s) and Role:     Thelma Hess MD History:  3/10/14 [facility]: BX BREAST PERCUT W/DEVICE      Comment: Right breast, two sites  No date:       Comment: Times 1  10/15/2015: [facility]ERT[facility] & Griselda Avina NDL/CATH SPI DX/THER KMT N/A      Comment: Procedure: CERVICAL EPIDURAL;  Surge Cardiovascular    Cardiovascular exam normal.         Dental  Comment: Caps in front on top, caps in back on bottom, left  No notable dental history. Pulmonary    Pulmonary exam normal.  Breath sounds clear to auscultation bilaterally.

## 2017-11-13 NOTE — ANESTHESIA POSTPROCEDURE EVALUATION
[address] Patient Status:  Hospital Outpatient Surgery   Age/Gender 45year old female MRN [id number]   Location [address] [place] [address] Attending Patito Gutierrez MD   [facility] Day # 0 PCP Alvan Hess, Landrum Merlin

## 2017-11-13 NOTE — H&P
Pre-op Diagnosis: Personal history of malignant neoplasm of breast [Z85.3]    The above referenced H&P was reviewed by May Amor MD on 11/13/2017, the patient was examined and no significant changes have occurred in the patient's condition since the H&

## 2017-11-13 NOTE — OPERATIVE REPORT
Pre-Operative Diagnosis: Personal history of malignant neoplasm of breast [Z85.3]     Post-Operative Diagnosis: Personal history of malignant neoplasm of breast [Z85.3]     Procedure Performed:   Procedure(s):  Revision of bilateral breast reconstruction administered   by the anesthesia service. 150 cc of tumescent fluid was infiltrated to bilateral thighs and 300 cc to bilateral flanks. The wings of the   skate flap were incised.  Subdermal flaps were elevated laterally to   medially towards the body of t

## 2017-12-11 PROCEDURE — 88175 CYTOPATH C/V AUTO FLUID REDO: CPT | Performed by: INTERNAL MEDICINE

## 2018-01-04 PROCEDURE — 86803 HEPATITIS C AB TEST: CPT | Performed by: INTERNAL MEDICINE

## 2018-09-18 NOTE — PLAN OF CARE
Problem: SKIN/TISSUE INTEGRITY - ADULT  Goal: Incision(s), wounds(s) or drain site(s) healing without S/S of infection  INTERVENTIONS:  - Assess and document risk factors for pressure ulcer development  - Assess and document skin integrity  - Assess and do [place])

## 2019-01-07 PROCEDURE — 88175 CYTOPATH C/V AUTO FLUID REDO: CPT | Performed by: INTERNAL MEDICINE

## 2019-01-14 ENCOUNTER — HOSPITAL ENCOUNTER (EMERGENCY)
Facility: HOSPITAL | Age: 54
Discharge: HOME | End: 2019-01-14
Payer: COMMERCIAL

## 2019-01-14 VITALS
SYSTOLIC BLOOD PRESSURE: 113 MMHG | TEMPERATURE: 98 F | HEART RATE: 81 BPM | WEIGHT: 160 LBS | BODY MASS INDEX: 25.71 KG/M2 | DIASTOLIC BLOOD PRESSURE: 71 MMHG | RESPIRATION RATE: 16 BRPM | OXYGEN SATURATION: 100 % | HEIGHT: 66 IN

## 2019-01-14 DIAGNOSIS — T50.905A ADVERSE EFFECT OF DRUG, INITIAL ENCOUNTER: Primary | ICD-10-CM

## 2019-01-14 LAB
ALBUMIN SERPL-MCNC: 4.2 G/DL (ref 3.1–4.5)
ALBUMIN/GLOB SERPL: 1.1 {RATIO} (ref 1–2)
ALP LIVER SERPL-CCNC: 72 U/L (ref 41–108)
ALT SERPL-CCNC: 29 U/L (ref 14–54)
ANION GAP SERPL CALC-SCNC: 29 MMOL/L (ref 0–18)
AST SERPL-CCNC: 20 U/L (ref 15–41)
ATRIAL RATE: 97 BPM
BASOPHILS # BLD AUTO: 0.07 X10(3) UL (ref 0–0.1)
BASOPHILS NFR BLD AUTO: 0.8 %
BILIRUB SERPL-MCNC: 0.5 MG/DL (ref 0.1–2)
BUN BLD-MCNC: 13 MG/DL (ref 8–20)
BUN/CREAT SERPL: 15.9 (ref 10–20)
CALCIUM BLD-MCNC: 9.7 MG/DL (ref 8.3–10.3)
CHLORIDE SERPL-SCNC: 85 MMOL/L (ref 101–111)
CO2 SERPL-SCNC: 26 MMOL/L (ref 22–32)
CREAT BLD-MCNC: 0.82 MG/DL (ref 0.55–1.02)
EOSINOPHIL # BLD AUTO: 0.08 X10(3) UL (ref 0–0.3)
EOSINOPHIL NFR BLD AUTO: 0.9 %
ERYTHROCYTE [DISTWIDTH] IN BLOOD BY AUTOMATED COUNT: 13.4 % (ref 11.5–16)
GLOBULIN PLAS-MCNC: 3.9 G/DL (ref 2.8–4.4)
GLUCOSE BLD-MCNC: 93 MG/DL (ref 70–99)
HCT VFR BLD AUTO: 46.1 % (ref 34–50)
HGB BLD-MCNC: 15.7 G/DL (ref 12–16)
IMMATURE GRANULOCYTE COUNT: 0.02 X10(3) UL (ref 0–1)
IMMATURE GRANULOCYTE RATIO %: 0.2 %
LYMPHOCYTES # BLD AUTO: 2.43 X10(3) UL (ref 0.9–4)
LYMPHOCYTES NFR BLD AUTO: 28.8 %
M PROTEIN MFR SERPL ELPH: 8.1 G/DL (ref 6.4–8.2)
MCH RBC QN AUTO: 31.9 PG (ref 27–33.2)
MCHC RBC AUTO-ENTMCNC: 34.1 G/DL (ref 31–37)
MCV RBC AUTO: 93.7 FL (ref 81–100)
MONOCYTES # BLD AUTO: 0.44 X10(3) UL (ref 0.1–1)
MONOCYTES NFR BLD AUTO: 5.2 %
NEUTROPHIL ABS PRELIM: 5.39 X10 (3) UL (ref 1.3–6.7)
NEUTROPHILS # BLD AUTO: 5.39 X10(3) UL (ref 1.3–6.7)
NEUTROPHILS NFR BLD AUTO: 64.1 %
OSMOLALITY SERPL CALC.SUM OF ELEC: 290 MOSM/KG (ref 275–295)
P AXIS: 57 DEGREES
P-R INTERVAL: 98 MS
PLATELET # BLD AUTO: 258 10(3)UL (ref 150–450)
POTASSIUM SERPL-SCNC: 4 MMOL/L (ref 3.6–5.1)
Q-T INTERVAL: 370 MS
QRS DURATION: 80 MS
QTC CALCULATION (BEZET): 469 MS
R AXIS: 69 DEGREES
RBC # BLD AUTO: 4.92 X10(6)UL (ref 3.8–5.1)
RED CELL DISTRIBUTION WIDTH-SD: 45.9 FL (ref 35.1–46.3)
SODIUM SERPL-SCNC: 140 MMOL/L (ref 136–144)
T AXIS: 56 DEGREES
VENTRICULAR RATE: 97 BPM
WBC # BLD AUTO: 8.4 X10(3) UL (ref 4–13)

## 2019-01-14 PROCEDURE — 99285 EMERGENCY DEPT VISIT HI MDM: CPT

## 2019-01-14 PROCEDURE — 93005 ELECTROCARDIOGRAM TRACING: CPT

## 2019-01-14 PROCEDURE — 85025 COMPLETE CBC W/AUTO DIFF WBC: CPT | Performed by: NURSE PRACTITIONER

## 2019-01-14 PROCEDURE — 96361 HYDRATE IV INFUSION ADD-ON: CPT

## 2019-01-14 PROCEDURE — 80053 COMPREHEN METABOLIC PANEL: CPT | Performed by: NURSE PRACTITIONER

## 2019-01-14 PROCEDURE — 93010 ELECTROCARDIOGRAM REPORT: CPT

## 2019-01-14 PROCEDURE — 96374 THER/PROPH/DIAG INJ IV PUSH: CPT

## 2019-01-14 RX ORDER — LORAZEPAM 1 MG/1
1 TABLET ORAL 2 TIMES DAILY PRN
Qty: 5 TABLET | Refills: 0 | Status: SHIPPED | OUTPATIENT
Start: 2019-01-14 | End: 2019-01-16

## 2019-01-14 RX ORDER — LORAZEPAM 2 MG/ML
1 INJECTION INTRAMUSCULAR ONCE
Status: COMPLETED | OUTPATIENT
Start: 2019-01-14 | End: 2019-01-14

## 2019-01-14 NOTE — ED NOTES
PA back at bedside to reassess patient and update with plan of care. IV fluid bolus hung at this time. Patient remains updated with plan of care. Son at bedside. Reports she continues to feel better.

## 2019-01-14 NOTE — ED NOTES
Report received from [facility] at this time. Patient resting on cart, appears anxious. Remains updated with plan of care. [place] PA back at bedside to reassess.

## 2019-01-14 NOTE — ED NOTES
Patient appears more relaxed, reports she is feeling better and anxiety decreased. Lights remain dimmed. VSS.

## 2019-01-14 NOTE — ED PROVIDER NOTES
Patient Seen in: [facility] Emergency Department    History   Patient presents with: Anxiety/Panic attack (neurologic)    Stated Complaint: Anxiety attack, tearful, stopped Paxil Jan 7th, was on it for a couple years.  d*      HPI  Patient is a 44[facility]    Right breast, two sites   •       Times 1   • CERVICAL EPIDURAL N/A 10/15/2015    Performed by Vignesh Escobar MD at [address]   • CERVICAL FACET INJECTION Right 2016    Performed by Josefa Moore MD at E noted above.     Physical Exam     ED Triage Vitals [01/14/19 1237]   /89   Pulse 100   Resp 20   Temp 98 °F (36.7 °C)   Temp src    SpO2 100 %   O2 Device None (Room air)       Current:/72   Pulse 83   Temp 98 °F (36.7 °C)   Resp 16   Ht 167.6 in upper and lower extremities without focal deficit  Sensation to gross touch equal and intact bilaterally in upper and lower extremities   without focal deficit       Skin: Skin is warm and dry. Capillary refill takes less than 2 seconds. No rash noted. Dr. Fahad Yoder office in the morning. Will discharge patient home with oral lorazepam and have patient follow-up with Dr. Jake Cunningham tomorrow. Advised patient to seek immediate medical attention for persistent or worsening symptoms.         Patient case d

## 2019-01-14 NOTE — ED INITIAL ASSESSMENT (HPI)
Sensation of \"constant electrical brain zaps\" unable to sleep for past week with dizziness and crying / reports last week stopped taking Paxil

## 2019-04-09 ENCOUNTER — OFFICE VISIT (OUTPATIENT)
Dept: SURGERY | Facility: CLINIC | Age: 54
End: 2019-04-09
Payer: COMMERCIAL

## 2019-04-09 VITALS — BODY MASS INDEX: 26.9 KG/M2 | HEIGHT: 66.5 IN | WEIGHT: 169.38 LBS

## 2019-04-09 DIAGNOSIS — Z17.0 BILATERAL MALIGNANT NEOPLASM OF BREAST IN FEMALE, ESTROGEN RECEPTOR POSITIVE, UNSPECIFIED SITE OF BREAST (HCC): Primary | ICD-10-CM

## 2019-04-09 DIAGNOSIS — C50.911 BILATERAL MALIGNANT NEOPLASM OF BREAST IN FEMALE, ESTROGEN RECEPTOR POSITIVE, UNSPECIFIED SITE OF BREAST (HCC): Primary | ICD-10-CM

## 2019-04-09 DIAGNOSIS — C50.912 BILATERAL MALIGNANT NEOPLASM OF BREAST IN FEMALE, ESTROGEN RECEPTOR POSITIVE, UNSPECIFIED SITE OF BREAST (HCC): Primary | ICD-10-CM

## 2019-04-09 PROCEDURE — 99203 OFFICE O/P NEW LOW 30 MIN: CPT | Performed by: SURGERY

## 2019-04-09 NOTE — CONSULTS
History of Present Illness: The patient is a 52year old female presents for consideration for revision breast surgery. The patient has a history of bilateral mastectomy in February 2017.   She underwent staged left breast tissue expander and silicone im Bilateral 7/6/2017    Performed by Carlito Garza MD at [facility] MAIN OR   • LUMPECTOMY RIGHT  4/14'    invasive ductal carcinoma   • JOSEY NEEDLE LOCALIZATION W/ SPECIMEN 1 SITE LEFT  4/14'    ADH   • MASTECTOMY LEFT  2017   • MASTECTOMY RIGHT  2017   • ORAL SURG Relation Age of Onset   • Diabetes Father    • Other (Other) Father         Epileptic   • Hypertension Mother    • Other (Other) Mother         COPD -  from pneumonia   • Breast Cancer Self 44   • Other (Other) Sister          Social History:    Umesh Burden rash, itching, skin lesions, dry skin, change in skin color or change in moles, sunburns, or sunburns with blistering.      Hematologic/Lymphatic:  The patient denies easily bruising or bleeding, persistent swollen glands or lymph nodes, bleeding disorders, right breast is soft no palpable masses. A reconstructed nipple with good projection is noted. Fading of the areolar tattooing is noted. Abdomen: No healed periumbilical and low transverse abdominal scars are noted.   Small amount of upper abdominal an

## 2020-07-06 ENCOUNTER — HOSPITAL ENCOUNTER (EMERGENCY)
Facility: HOSPITAL | Age: 55
Discharge: HOME | End: 2020-07-06
Attending: EMERGENCY MEDICINE
Payer: COMMERCIAL

## 2020-07-06 VITALS
HEART RATE: 95 BPM | WEIGHT: 157 LBS | OXYGEN SATURATION: 98 % | SYSTOLIC BLOOD PRESSURE: 160 MMHG | TEMPERATURE: 99 F | DIASTOLIC BLOOD PRESSURE: 89 MMHG | HEIGHT: 64 IN | BODY MASS INDEX: 26.8 KG/M2 | RESPIRATION RATE: 17 BRPM

## 2020-07-06 DIAGNOSIS — R42 LIGHTHEADED: Primary | ICD-10-CM

## 2020-07-06 LAB
ALBUMIN SERPL-MCNC: 4 G/DL (ref 3.4–5)
ALBUMIN/GLOB SERPL: 1.1 {RATIO} (ref 1–2)
ALP LIVER SERPL-CCNC: 59 U/L (ref 46–118)
ALT SERPL-CCNC: 28 U/L (ref 13–56)
ANION GAP SERPL CALC-SCNC: 2 MMOL/L (ref 0–18)
AST SERPL-CCNC: 16 U/L (ref 15–37)
BASOPHILS # BLD AUTO: 0.05 X10(3) UL (ref 0–0.2)
BASOPHILS NFR BLD AUTO: 0.4 %
BILIRUB SERPL-MCNC: 0.3 MG/DL (ref 0.1–2)
BUN BLD-MCNC: 16 MG/DL (ref 7–18)
BUN/CREAT SERPL: 16.3 (ref 10–20)
CALCIUM BLD-MCNC: 9.9 MG/DL (ref 8.5–10.1)
CHLORIDE SERPL-SCNC: 104 MMOL/L (ref 98–112)
CO2 SERPL-SCNC: 31 MMOL/L (ref 21–32)
CREAT BLD-MCNC: 0.98 MG/DL (ref 0.55–1.02)
DEPRECATED RDW RBC AUTO: 45 FL (ref 35.1–46.3)
EOSINOPHIL # BLD AUTO: 0.11 X10(3) UL (ref 0–0.7)
EOSINOPHIL NFR BLD AUTO: 0.9 %
ERYTHROCYTE [DISTWIDTH] IN BLOOD BY AUTOMATED COUNT: 13.1 % (ref 11–15)
GLOBULIN PLAS-MCNC: 3.6 G/DL (ref 2.8–4.4)
GLUCOSE BLD-MCNC: 99 MG/DL (ref 70–99)
HCT VFR BLD AUTO: 45.5 % (ref 35–48)
HGB BLD-MCNC: 15.2 G/DL (ref 12–16)
IMM GRANULOCYTES # BLD AUTO: 0.04 X10(3) UL (ref 0–1)
IMM GRANULOCYTES NFR BLD: 0.3 %
LYMPHOCYTES # BLD AUTO: 2.36 X10(3) UL (ref 1–4)
LYMPHOCYTES NFR BLD AUTO: 20.1 %
M PROTEIN MFR SERPL ELPH: 7.6 G/DL (ref 6.4–8.2)
MCH RBC QN AUTO: 31.3 PG (ref 26–34)
MCHC RBC AUTO-ENTMCNC: 33.4 G/DL (ref 31–37)
MCV RBC AUTO: 93.6 FL (ref 80–100)
MONOCYTES # BLD AUTO: 0.88 X10(3) UL (ref 0.1–1)
MONOCYTES NFR BLD AUTO: 7.5 %
NEUTROPHILS # BLD AUTO: 8.33 X10 (3) UL (ref 1.5–7.7)
NEUTROPHILS # BLD AUTO: 8.33 X10(3) UL (ref 1.5–7.7)
NEUTROPHILS NFR BLD AUTO: 70.8 %
OSMOLALITY SERPL CALC.SUM OF ELEC: 285 MOSM/KG (ref 275–295)
PLATELET # BLD AUTO: 236 10(3)UL (ref 150–450)
POTASSIUM SERPL-SCNC: 4 MMOL/L (ref 3.5–5.1)
RBC # BLD AUTO: 4.86 X10(6)UL (ref 3.8–5.3)
SODIUM SERPL-SCNC: 137 MMOL/L (ref 136–145)
T4 FREE SERPL-MCNC: 1.2 NG/DL (ref 0.8–1.7)
TSI SER-ACNC: 2.2 MIU/ML (ref 0.36–3.74)
WBC # BLD AUTO: 11.8 X10(3) UL (ref 4–11)

## 2020-07-06 PROCEDURE — 36415 COLL VENOUS BLD VENIPUNCTURE: CPT

## 2020-07-06 PROCEDURE — 80053 COMPREHEN METABOLIC PANEL: CPT | Performed by: EMERGENCY MEDICINE

## 2020-07-06 PROCEDURE — 84443 ASSAY THYROID STIM HORMONE: CPT | Performed by: EMERGENCY MEDICINE

## 2020-07-06 PROCEDURE — 99284 EMERGENCY DEPT VISIT MOD MDM: CPT

## 2020-07-06 PROCEDURE — 93005 ELECTROCARDIOGRAM TRACING: CPT

## 2020-07-06 PROCEDURE — 84439 ASSAY OF FREE THYROXINE: CPT | Performed by: EMERGENCY MEDICINE

## 2020-07-06 PROCEDURE — 85025 COMPLETE CBC W/AUTO DIFF WBC: CPT | Performed by: EMERGENCY MEDICINE

## 2020-07-06 PROCEDURE — 93010 ELECTROCARDIOGRAM REPORT: CPT

## 2020-07-07 LAB
ATRIAL RATE: 87 BPM
P AXIS: 74 DEGREES
P-R INTERVAL: 120 MS
Q-T INTERVAL: 382 MS
QRS DURATION: 72 MS
QTC CALCULATION (BEZET): 459 MS
R AXIS: 70 DEGREES
T AXIS: 59 DEGREES
VENTRICULAR RATE: 87 BPM

## 2020-07-07 NOTE — ED PROVIDER NOTES
Patient Seen in: [facility] Emergency Department      History   Patient presents with:  Dizziness    Stated Complaint: dizziness    HPI    47-year-old female presents with 3 days of lightheadedness.   She states at times she feels like she might brayan • CERVICAL FACET INJECTION Left 2/2/2016    Performed by Sami Phelps MD at [address]   • FAT GRAFTING Bilateral 11/13/2017    Performed by Chelly Renteria MD at [facility]   • LIPOSUCTION Bilateral 7/6/2017    [facility] 06/22/2020 (Approximate)   SpO2 98%   BMI 26.95 kg/m²         Physical Exam    General:  Vitals as listed. No acute distress   HEENT: Sclerae anicteric. Conjunctivae show no pallor.   Oropharynx clear, mucous membranes moist   Neck: supple, no rigidit is unremarkable. No headaches. She is encouraged to talk with her primary care doctor that this may be related to menopause and hot flashes. She is comfortable and states she feels better knowing that her tests are okay.   To return with any concerns

## 2020-09-11 PROBLEM — M54.12 CERVICAL RADICULITIS: Status: ACTIVE | Noted: 2020-09-11

## 2020-09-11 PROBLEM — M48.02 FORAMINAL STENOSIS OF CERVICAL REGION: Status: ACTIVE | Noted: 2020-09-11

## 2021-04-03 ENCOUNTER — LAB ENCOUNTER (OUTPATIENT)
Dept: LAB | Facility: HOSPITAL | Age: 56
End: 2021-04-03
Attending: INTERNAL MEDICINE
Payer: COMMERCIAL

## 2021-04-03 DIAGNOSIS — Z01.818 PRE-OP TESTING: ICD-10-CM

## 2021-04-06 PROBLEM — Z86.0100 PERSONAL HISTORY OF COLONIC POLYPS: Status: ACTIVE | Noted: 2021-04-06

## 2021-04-06 PROBLEM — Z86.010 PERSONAL HISTORY OF COLONIC POLYPS: Status: ACTIVE | Noted: 2021-04-06

## 2022-10-23 ENCOUNTER — APPOINTMENT (OUTPATIENT)
Dept: GENERAL RADIOLOGY | Facility: HOSPITAL | Age: 57
End: 2022-10-23
Payer: COMMERCIAL

## 2022-10-23 ENCOUNTER — HOSPITAL ENCOUNTER (EMERGENCY)
Facility: HOSPITAL | Age: 57
Discharge: HOME | End: 2022-10-23
Payer: COMMERCIAL

## 2022-10-23 VITALS
DIASTOLIC BLOOD PRESSURE: 88 MMHG | TEMPERATURE: 98 F | HEART RATE: 80 BPM | RESPIRATION RATE: 22 BRPM | OXYGEN SATURATION: 99 % | SYSTOLIC BLOOD PRESSURE: 156 MMHG

## 2022-10-23 DIAGNOSIS — S61.319A LACERATION OF NAIL BED OF FINGER, INITIAL ENCOUNTER: Primary | ICD-10-CM

## 2022-10-23 DIAGNOSIS — S60.132A CONTUSION OF LEFT MIDDLE FINGER WITH DAMAGE TO NAIL, INITIAL ENCOUNTER: ICD-10-CM

## 2022-10-23 PROCEDURE — 99283 EMERGENCY DEPT VISIT LOW MDM: CPT | Performed by: EMERGENCY MEDICINE

## 2022-10-23 PROCEDURE — 12041 INTMD RPR N-HF/GENIT 2.5CM/<: CPT | Performed by: EMERGENCY MEDICINE

## 2022-10-23 PROCEDURE — 73130 X-RAY EXAM OF HAND: CPT

## 2022-10-23 RX ORDER — HYDROCODONE BITARTRATE AND ACETAMINOPHEN 5; 325 MG/1; MG/1
1 TABLET ORAL EVERY 8 HOURS PRN
Qty: 10 TABLET | Refills: 0 | Status: SHIPPED | OUTPATIENT
Start: 2022-10-23

## 2022-10-23 RX ORDER — CEFADROXIL 500 MG/1
500 CAPSULE ORAL 2 TIMES DAILY
Qty: 14 CAPSULE | Refills: 0 | Status: SHIPPED | OUTPATIENT
Start: 2022-10-23 | End: 2022-10-30

## 2022-10-23 NOTE — ED INITIAL ASSESSMENT (HPI)
Pt arrives ambulatory to triage, states she was lowering a garage door when her finger Left 3rd digit got stuck. Laceration noted to tip of finger and nail- no deformity noted. Up to date on tetanus. A&O x4.

## 2023-08-21 ENCOUNTER — APPOINTMENT (OUTPATIENT)
Dept: GENERAL RADIOLOGY | Facility: HOSPITAL | Age: 58
End: 2023-08-21
Attending: EMERGENCY MEDICINE
Payer: COMMERCIAL

## 2023-08-21 ENCOUNTER — HOSPITAL ENCOUNTER (EMERGENCY)
Facility: HOSPITAL | Age: 58
Discharge: HOME | End: 2023-08-21
Attending: EMERGENCY MEDICINE
Payer: COMMERCIAL

## 2023-08-21 VITALS
TEMPERATURE: 98 F | HEART RATE: 114 BPM | OXYGEN SATURATION: 98 % | RESPIRATION RATE: 20 BRPM | DIASTOLIC BLOOD PRESSURE: 86 MMHG | BODY MASS INDEX: 27.49 KG/M2 | WEIGHT: 165 LBS | SYSTOLIC BLOOD PRESSURE: 157 MMHG | HEIGHT: 65 IN

## 2023-08-21 DIAGNOSIS — R06.00 DYSPNEA, UNSPECIFIED TYPE: Primary | ICD-10-CM

## 2023-08-21 LAB
ALBUMIN SERPL-MCNC: 4.1 G/DL (ref 3.4–5)
ALBUMIN/GLOB SERPL: 1.1 {RATIO} (ref 1–2)
ALP LIVER SERPL-CCNC: 78 U/L
ALT SERPL-CCNC: 41 U/L
ANION GAP SERPL CALC-SCNC: 6 MMOL/L (ref 0–18)
AST SERPL-CCNC: 21 U/L (ref 15–37)
ATRIAL RATE: 89 BPM
BASOPHILS # BLD AUTO: 0.04 X10(3) UL (ref 0–0.2)
BASOPHILS NFR BLD AUTO: 0.5 %
BILIRUB SERPL-MCNC: 0.6 MG/DL (ref 0.1–2)
BILIRUB UR QL STRIP.AUTO: NEGATIVE
BUN BLD-MCNC: 14 MG/DL (ref 7–18)
CALCIUM BLD-MCNC: 10.3 MG/DL (ref 8.5–10.1)
CHLORIDE SERPL-SCNC: 105 MMOL/L (ref 98–112)
CLARITY UR REFRACT.AUTO: CLEAR
CO2 SERPL-SCNC: 27 MMOL/L (ref 21–32)
CREAT BLD-MCNC: 0.82 MG/DL
D DIMER PPP FEU-MCNC: <0.27 UG/ML FEU (ref ?–0.6)
EGFRCR SERPLBLD CKD-EPI 2021: 82 ML/MIN/1.73M2 (ref 60–?)
EOSINOPHIL # BLD AUTO: 0.03 X10(3) UL (ref 0–0.7)
EOSINOPHIL NFR BLD AUTO: 0.4 %
ERYTHROCYTE [DISTWIDTH] IN BLOOD BY AUTOMATED COUNT: 13 %
GLOBULIN PLAS-MCNC: 3.6 G/DL (ref 2.8–4.4)
GLUCOSE BLD-MCNC: 132 MG/DL (ref 70–99)
GLUCOSE UR STRIP.AUTO-MCNC: NORMAL MG/DL
HCT VFR BLD AUTO: 42.5 %
HGB BLD-MCNC: 14.4 G/DL
IMM GRANULOCYTES # BLD AUTO: 0.02 X10(3) UL (ref 0–1)
IMM GRANULOCYTES NFR BLD: 0.2 %
KETONES UR STRIP.AUTO-MCNC: NEGATIVE MG/DL
LEUKOCYTE ESTERASE UR QL STRIP.AUTO: NEGATIVE
LYMPHOCYTES # BLD AUTO: 1.71 X10(3) UL (ref 1–4)
LYMPHOCYTES NFR BLD AUTO: 21.3 %
MCH RBC QN AUTO: 30.4 PG (ref 26–34)
MCHC RBC AUTO-ENTMCNC: 33.9 G/DL (ref 31–37)
MCV RBC AUTO: 89.7 FL
MONOCYTES # BLD AUTO: 0.46 X10(3) UL (ref 0.1–1)
MONOCYTES NFR BLD AUTO: 5.7 %
NEUTROPHILS # BLD AUTO: 5.76 X10 (3) UL (ref 1.5–7.7)
NEUTROPHILS # BLD AUTO: 5.76 X10(3) UL (ref 1.5–7.7)
NEUTROPHILS NFR BLD AUTO: 71.9 %
NITRITE UR QL STRIP.AUTO: NEGATIVE
OSMOLALITY SERPL CALC.SUM OF ELEC: 288 MOSM/KG (ref 275–295)
P AXIS: 70 DEGREES
P-R INTERVAL: 116 MS
PH UR STRIP.AUTO: 6 [PH] (ref 5–8)
PLATELET # BLD AUTO: 237 10(3)UL (ref 150–450)
POTASSIUM SERPL-SCNC: 3.5 MMOL/L (ref 3.5–5.1)
PROT SERPL-MCNC: 7.7 G/DL (ref 6.4–8.2)
PROT UR STRIP.AUTO-MCNC: NEGATIVE MG/DL
Q-T INTERVAL: 380 MS
QRS DURATION: 74 MS
QTC CALCULATION (BEZET): 462 MS
R AXIS: 67 DEGREES
RBC # BLD AUTO: 4.74 X10(6)UL
RBC UR QL AUTO: NEGATIVE
SARS-COV-2 RNA RESP QL NAA+PROBE: NOT DETECTED
SODIUM SERPL-SCNC: 138 MMOL/L (ref 136–145)
SP GR UR STRIP.AUTO: <1.005 (ref 1–1.03)
T AXIS: 68 DEGREES
UROBILINOGEN UR STRIP.AUTO-MCNC: NORMAL MG/DL
VENTRICULAR RATE: 89 BPM
WBC # BLD AUTO: 8 X10(3) UL (ref 4–11)

## 2023-08-21 PROCEDURE — 71046 X-RAY EXAM CHEST 2 VIEWS: CPT | Performed by: EMERGENCY MEDICINE

## 2023-08-21 PROCEDURE — 80053 COMPREHEN METABOLIC PANEL: CPT | Performed by: EMERGENCY MEDICINE

## 2023-08-21 PROCEDURE — 85025 COMPLETE CBC W/AUTO DIFF WBC: CPT | Performed by: EMERGENCY MEDICINE

## 2023-08-21 PROCEDURE — 81003 URINALYSIS AUTO W/O SCOPE: CPT | Performed by: EMERGENCY MEDICINE

## 2023-08-21 PROCEDURE — 93005 ELECTROCARDIOGRAM TRACING: CPT

## 2023-08-21 PROCEDURE — 85379 FIBRIN DEGRADATION QUANT: CPT | Performed by: EMERGENCY MEDICINE

## 2023-08-21 PROCEDURE — 93010 ELECTROCARDIOGRAM REPORT: CPT

## 2023-08-21 PROCEDURE — 85025 COMPLETE CBC W/AUTO DIFF WBC: CPT

## 2023-08-21 PROCEDURE — 99285 EMERGENCY DEPT VISIT HI MDM: CPT

## 2023-08-21 PROCEDURE — 36415 COLL VENOUS BLD VENIPUNCTURE: CPT

## 2023-08-21 PROCEDURE — 80053 COMPREHEN METABOLIC PANEL: CPT

## 2023-08-21 PROCEDURE — 99284 EMERGENCY DEPT VISIT MOD MDM: CPT

## 2023-08-21 RX ORDER — FLUOXETINE HYDROCHLORIDE 20 MG/1
20 CAPSULE ORAL DAILY
COMMUNITY

## 2023-08-21 RX ORDER — LORAZEPAM 0.5 MG/1
0.5 TABLET ORAL 2 TIMES DAILY PRN
Qty: 7 TABLET | Refills: 0 | Status: SHIPPED | OUTPATIENT
Start: 2023-08-21

## 2023-08-21 RX ORDER — ONDANSETRON 4 MG/1
4 TABLET, ORALLY DISINTEGRATING ORAL ONCE
Status: DISCONTINUED | OUTPATIENT
Start: 2023-08-21 | End: 2023-08-21

## 2023-08-21 NOTE — DISCHARGE INSTRUCTIONS
Thankfully there are no signs of a blood clot or pneumonia or COVID or anemia causing her symptoms.     If you feel like this may be anxiety related, lorazepam may help    May represent a viral illness but it does not seem to be COVID at this time point

## 2023-08-21 NOTE — ED INITIAL ASSESSMENT (HPI)
Pt states feeling central abd pain and SOB. Pt states feeling full. Pt states feeling \"panicy\". Pt denies fevers or cough.   Pt states chills

## 2024-05-02 ENCOUNTER — LAB REQUISITION (OUTPATIENT)
Dept: LAB | Facility: HOSPITAL | Age: 59
End: 2024-05-02
Payer: COMMERCIAL

## 2024-05-02 DIAGNOSIS — L72.3 SEBACEOUS CYST: ICD-10-CM

## 2024-05-02 PROCEDURE — 88304 TISSUE EXAM BY PATHOLOGIST: CPT | Performed by: SURGERY

## 2024-05-12 ENCOUNTER — APPOINTMENT (OUTPATIENT)
Dept: MRI IMAGING | Facility: HOSPITAL | Age: 59
End: 2024-05-12
Attending: EMERGENCY MEDICINE

## 2024-05-12 ENCOUNTER — APPOINTMENT (OUTPATIENT)
Dept: CT IMAGING | Facility: HOSPITAL | Age: 59
End: 2024-05-12
Attending: EMERGENCY MEDICINE

## 2024-05-12 ENCOUNTER — HOSPITAL ENCOUNTER (EMERGENCY)
Facility: HOSPITAL | Age: 59
Discharge: HOME | End: 2024-05-12
Attending: EMERGENCY MEDICINE

## 2024-05-12 VITALS
OXYGEN SATURATION: 100 % | SYSTOLIC BLOOD PRESSURE: 143 MMHG | WEIGHT: 145 LBS | HEIGHT: 65 IN | RESPIRATION RATE: 13 BRPM | DIASTOLIC BLOOD PRESSURE: 77 MMHG | HEART RATE: 91 BPM | BODY MASS INDEX: 24.16 KG/M2 | TEMPERATURE: 100 F

## 2024-05-12 DIAGNOSIS — H81.10 BENIGN PAROXYSMAL POSITIONAL VERTIGO, UNSPECIFIED LATERALITY: Primary | ICD-10-CM

## 2024-05-12 LAB
ALBUMIN SERPL-MCNC: 3.7 G/DL (ref 3.4–5)
ALBUMIN/GLOB SERPL: 1.1 {RATIO} (ref 1–2)
ALP LIVER SERPL-CCNC: 72 U/L
ALT SERPL-CCNC: 29 U/L
ANION GAP SERPL CALC-SCNC: 5 MMOL/L (ref 0–18)
AST SERPL-CCNC: 11 U/L (ref 15–37)
BASOPHILS # BLD AUTO: 0.02 X10(3) UL (ref 0–0.2)
BASOPHILS NFR BLD AUTO: 0.3 %
BILIRUB SERPL-MCNC: 0.4 MG/DL (ref 0.1–2)
BUN BLD-MCNC: 16 MG/DL (ref 9–23)
CALCIUM BLD-MCNC: 9.5 MG/DL (ref 8.5–10.1)
CHLORIDE SERPL-SCNC: 104 MMOL/L (ref 98–112)
CO2 SERPL-SCNC: 29 MMOL/L (ref 21–32)
CREAT BLD-MCNC: 0.88 MG/DL
EGFRCR SERPLBLD CKD-EPI 2021: 75 ML/MIN/1.73M2 (ref 60–?)
EOSINOPHIL # BLD AUTO: 0.05 X10(3) UL (ref 0–0.7)
EOSINOPHIL NFR BLD AUTO: 0.8 %
ERYTHROCYTE [DISTWIDTH] IN BLOOD BY AUTOMATED COUNT: 13.3 %
GLOBULIN PLAS-MCNC: 3.3 G/DL (ref 2.8–4.4)
GLUCOSE BLD-MCNC: 153 MG/DL (ref 70–99)
HCT VFR BLD AUTO: 31.4 %
HGB BLD-MCNC: 10.1 G/DL
IMM GRANULOCYTES # BLD AUTO: 0.01 X10(3) UL (ref 0–1)
IMM GRANULOCYTES NFR BLD: 0.2 %
LYMPHOCYTES # BLD AUTO: 2.23 X10(3) UL (ref 1–4)
LYMPHOCYTES NFR BLD AUTO: 36.4 %
MCH RBC QN AUTO: 30.3 PG (ref 26–34)
MCHC RBC AUTO-ENTMCNC: 32.2 G/DL (ref 31–37)
MCV RBC AUTO: 94.3 FL
MONOCYTES # BLD AUTO: 0.33 X10(3) UL (ref 0.1–1)
MONOCYTES NFR BLD AUTO: 5.4 %
NEUTROPHILS # BLD AUTO: 3.49 X10 (3) UL (ref 1.5–7.7)
NEUTROPHILS # BLD AUTO: 3.49 X10(3) UL (ref 1.5–7.7)
NEUTROPHILS NFR BLD AUTO: 56.9 %
OSMOLALITY SERPL CALC.SUM OF ELEC: 290 MOSM/KG (ref 275–295)
PLATELET # BLD AUTO: 156 10(3)UL (ref 150–450)
POTASSIUM SERPL-SCNC: 3.5 MMOL/L (ref 3.5–5.1)
PROT SERPL-MCNC: 7 G/DL (ref 6.4–8.2)
RBC # BLD AUTO: 3.33 X10(6)UL
SODIUM SERPL-SCNC: 138 MMOL/L (ref 136–145)
TROPONIN I SERPL HS-MCNC: <3 NG/L
WBC # BLD AUTO: 6.1 X10(3) UL (ref 4–11)

## 2024-05-12 PROCEDURE — 93010 ELECTROCARDIOGRAM REPORT: CPT

## 2024-05-12 PROCEDURE — 96374 THER/PROPH/DIAG INJ IV PUSH: CPT

## 2024-05-12 PROCEDURE — 96376 TX/PRO/DX INJ SAME DRUG ADON: CPT

## 2024-05-12 PROCEDURE — 85025 COMPLETE CBC W/AUTO DIFF WBC: CPT | Performed by: EMERGENCY MEDICINE

## 2024-05-12 PROCEDURE — 80053 COMPREHEN METABOLIC PANEL: CPT | Performed by: EMERGENCY MEDICINE

## 2024-05-12 PROCEDURE — 99285 EMERGENCY DEPT VISIT HI MDM: CPT

## 2024-05-12 PROCEDURE — 84484 ASSAY OF TROPONIN QUANT: CPT | Performed by: EMERGENCY MEDICINE

## 2024-05-12 PROCEDURE — 93005 ELECTROCARDIOGRAM TRACING: CPT

## 2024-05-12 PROCEDURE — 70496 CT ANGIOGRAPHY HEAD: CPT | Performed by: EMERGENCY MEDICINE

## 2024-05-12 PROCEDURE — 70551 MRI BRAIN STEM W/O DYE: CPT | Performed by: EMERGENCY MEDICINE

## 2024-05-12 PROCEDURE — 70498 CT ANGIOGRAPHY NECK: CPT | Performed by: EMERGENCY MEDICINE

## 2024-05-12 RX ORDER — MECLIZINE HYDROCHLORIDE 25 MG/1
25 TABLET ORAL 3 TIMES DAILY PRN
Qty: 30 TABLET | Refills: 0 | Status: SHIPPED | OUTPATIENT
Start: 2024-05-12

## 2024-05-12 RX ORDER — MECLIZINE HYDROCHLORIDE 25 MG/1
50 TABLET ORAL ONCE
Status: COMPLETED | OUTPATIENT
Start: 2024-05-12 | End: 2024-05-12

## 2024-05-12 RX ORDER — DICLOFENAC SODIUM 75 MG/1
75 TABLET, DELAYED RELEASE ORAL 2 TIMES DAILY
Qty: 20 TABLET | Refills: 0 | Status: SHIPPED | OUTPATIENT
Start: 2024-05-12

## 2024-05-12 NOTE — ED PROVIDER NOTES
Patient Seen in: [facility] Emergency Department      History     Chief Complaint   Patient presents with    Dizziness     Stated Complaint: VERTIGO, DIZZINESS, DISORIENTED    Subjective:   HPI    Patient is a 59-year-old female who has had neck pain and vertigo for couple weeks now.  She was seen at immediate care last week and follows up with orthospine.  She has had this before.  Follows with orthospine.  Did not get any imaging at that time.    Patient states she was prescribed a Medrol Dosepak and Flexeril this has not helped.  She is having increasing pain dizziness.  Is worse when she gets up or moves her head so she spends most of her time on the couch.  Today symptoms were especially worse with associated blurry vision.  She came to the ED via EMS for evaluation.  No chest pain or shortness of breath.  No syncope.  No other complaints.    Objective:   Past Medical History:    Anxiety    Anxiety state    Arthritis    Rt. Knee    Back problem    neck pain    Breast cancer (HCC)    '-invasive ductal carcinoma    Disorder of thyroid    Exposure to radiation    High cholesterol    Under control    Hyperlipidemia    Hypothyroidism    Pain in joints    Neck pain    Presence of other cardiac implants and grafts    Breast implants/dbl. Mastectomy    Visual impairment    glasses    Vitamin D deficiency    Wears glasses    I wear glasses              Past Surgical History:   Procedure Laterality Date    Bx breast percut w/device  3/10/14 [facility]    Right breast, two sites          Times 1    Colonoscopy  6 years ago    Fluor gid & loclzj ndl/cath spi dx/ther njx N/A 10/15/2015    Procedure: CERVICAL EPIDURAL;  Surgeon: Macario Obrien MD;  Location: [facility]    Injection, w/wo contrast, dx/therapeutic substance, epidural/subarachnoid; cervical/thoracic N/A 10/15/2015    Procedure: CERVICAL EPIDURAL;  Surgeon: Macario Obrien MD;  Location: [facility]    Lumpectomy right  4/14'    invasive ductal carcinoma    M-sedaj by sm phys perfrmg svc 5+ yr N/A 10/15/2015    Procedure: CERVICAL EPIDURAL;  Surgeon: Macario Obrien MD;  Location: [facility]    Lidya localization wire 1 site left (cpt=19281)  4/14'    ADH    Mastectomy left  2017    Mastectomy right  2017    Oral surgery procedure      [facility]demona Teeth Extraction    Other surgical history  2010    Bilateral Lasik surgery     Other surgical history  4/10/14 [facility]    Right Breast Lumpectomy w/SLN bx; Left Exc of ADH; all under needle localization    Other surgical history  07/2017    Breast Implantation    Other surgical history  07/2017    Liposuction for Breast implantstion area    Other surgical history      Tummy tuck    Other surgical history  11/2017    Fat grafting on lthe left side. nipple reconstruction on the left breast    Radiation right  14'    invasive ductal carcinoma    Reduction left  05'    Reduction right  05'    Tonsillectomy                  No pertinent social history.            Review of Systems    Positive for stated complaint: VERTIGO, DIZZINESS, DISORIENTED  Other systems are as noted in HPI.  Constitutional and vital signs reviewed.      All other systems reviewed and negative except as noted above.    Physical Exam     ED Triage Vitals   BP 05/12/24 1507 155/89   Pulse 05/12/24 1507 102   Resp 05/12/24 1507 19   Temp 05/12/24 1520 99.5 °F (37.5 °C)   Temp src 05/12/24 1520 Temporal   SpO2 05/12/24 1507 100 %   O2 Device 05/12/24 1507 None (Room air)       Current Vitals:   Vital Signs  BP: 143/77  Pulse: 91  Resp: 13  Temp: 99.5 °F (37.5 °C)  Temp src: Temporal  MAP (mmHg): 98    Oxygen Therapy  SpO2: 100 %  O2 Device: None (Room air)            Physical Exam  Vitals and nursing note reviewed.   Constitutional:       General: She is not in acute distress.     Appearance: She is well-developed. She is not toxic-appearing.   HENT:      Head: Normocephalic  and atraumatic.   Eyes:      General: No scleral icterus.     Conjunctiva/sclera: Conjunctivae normal.   Cardiovascular:      Rate and Rhythm: Normal rate.   Pulmonary:      Effort: Pulmonary effort is normal. No respiratory distress.   Abdominal:      General: There is no distension.   Musculoskeletal:         General: No tenderness. Normal range of motion.      Cervical back: Normal range of motion and neck supple.   Skin:     General: Skin is warm and dry.      Findings: No rash.   Neurological:      Mental Status: She is alert and oriented to person, place, and time.      GCS: GCS eye subscore is 4. GCS verbal subscore is 5. GCS motor subscore is 6.      Cranial Nerves: No cranial nerve deficit or facial asymmetry.      Sensory: No sensory deficit.      Motor: No weakness or abnormal muscle tone.   Psychiatric:         Behavior: Behavior normal.              ED Course     Labs Reviewed   COMP METABOLIC PANEL (14) - Abnormal; Notable for the following components:       Result Value    Glucose 153 (*)     AST 11 (*)     All other components within normal limits   CBC W/ DIFFERENTIAL - Abnormal; Notable for the following components:    RBC 3.33 (*)     HGB 10.1 (*)     HCT 31.4 (*)     All other components within normal limits   TROPONIN I HIGH SENSITIVITY - Normal   CBC WITH DIFFERENTIAL WITH PLATELET    Narrative:     The following orders were created for panel order CBC With Differential With Platelet.  Procedure                               Abnormality         Status                     ---------                               -----------         ------                     CBC W/ DIFFERENTIAL[[id number]]          Abnormal            Final result                 Please view results for these tests on the individual orders.   RAINBOW DRAW LAVENDER   RAINBOW DRAW LIGHT GREEN   RAINBOW DRAW BLUE     EKG    Rate, intervals and axes as noted on EKG Report.  Rate: 103  Rhythm: Sinus Rhythm  Reading:    Sinus  tachycardia.  No ischemic changes                           MDM          -Tracing on cardiac monitor and pulse oximetry was reviewed by myself.   -The cardiac monitor revealed normal sinus rhythm as interpreted by me. The cardiac monitor was ordered to monitor the patient for dysrhythmia  -Pulse oximetry was interpreted by me and was normal.  Pulse oximeter was ordered to monitor patient for hypoxia.        -History source other than patient -spouse        -Comorbidities did add complexity to the management are mentioned in the HPI above        -I personally reviewed the prior external notes and the medical record to obtain additional history reviewed immediate care note from May 6.  She was prescribed cyclobenzaprine and Medrol Dosepak.        -DDX: Includes but not limited to -peripheral vertigo central vertigo, musculoskeletal pain, carotid /vertebral dissection           -I personally reviewed the CT findings and it shows no hemorrhage or dissection  Please refer to radiology report for official interpretation  MRI BRAIN (CPT=70551)   Final Result   PROCEDURE:  MRI BRAIN (CPT=70551)       COMPARISON:  None.       INDICATIONS:  VERTIGO, DIZZINESS, DISORIENTED       TECHNIQUE:  MRI of the brain was performed with multi-planar T1,    T2-weighted images with FLAIR sequences and diffusion weighted images    without infusion.       PATIENT STATED HISTORY: (As transcribed by Technologist)  c/o dizziness,    vertigo, headache x 2 weeks. Pt states it occurs all the time, to the    point she feels like she will pass out. Pt also c/o severe neck pain. Pt    states having some blurry vision    intermittently.            FINDINGS:     The ventricles and sulci are within normal limits.  There is no midline    shift or mass effect.  The basal cisterns are patent.         There is no acute intracranial hemorrhage or extra-axial fluid collection    identified.  There are a few punctate foci of T2 hyperintense signal    within  the left frontal lobe which is a nonspecific finding.  There is no    restricted diffusion to suggest acute    ischemia/infarction.       The visualized paranasal sinuses and mastoid air cells are unremarkable.     The expected major intracranial flow voids are present.                         =====   CONCLUSION:  No acute infarct, acute intracranial hemorrhage, or    hydrocephalus.           LOCATION:  [facility]           Dictated by (CST): Stromberg, LeRoy, MD on 5/12/2024 at 6:18 PM        Finalized by (CST): Stromberg, LeRoy, MD on 5/12/2024 at 6:22 PM         CTA BRAIN + CTA CAROTIDS (CPT=70496/76975)   Final Result   PROCEDURE:  CTA BRAIN + CTA CAROTIDS (CPT=70496/48044)       COMPARISON:  [facility] , CT, CTA BRAIN + CTA CAROTIDS (CPT=70496/07264),    7/11/2014, 11:35 AM.       INDICATIONS:  VERTIGO, DIZZINESS, DISORIENTED       TECHNIQUE:  CT angiography of the head and neck were obtained with    non-ionic contrast.  3D volume rendering images were obtained by the    technologist as well as the radiologist on an independent workstation.     Multiplanar 3D reformatted imaging including    multiplanar MIP images were obtained.  Curved planar reformats were    performed through the carotid and vertebral arteries. All measurements    obtained in this exam were performed using NASCET criteria.  Dose    reduction techniques were used. Dose information    is transmitted to the ACR (American College of Radiology) NRDR (National    Radiology Data Registry) which includes the Dose Index Registry.       PATIENT STATED HISTORY:(As transcribed by Technologist)  Dizziness and    blurred vision for 2 weeks.         CONTRAST USED:  75cc of Isovue 370       FINDINGS:         Pre contrast images of the brain appear without acute abnormality, with    ventricles and sulci normal size, no mass effect, midline shift,    hydrocephalus, herniation, extra-axial fluid collection.  No CT features    indicating acute infarct.  No findings of     acute sinusitis or mastoiditis.        Images of the upper lungs included on this examination show no acute    process in the upper lungs.  There is no acute disease process identified    involving the spine structures visualized.       CTA:       Extracranial CTA: CTA of the neck shows patency of the right and left    vertebral arteries, the right and left common carotid, internal carotid    and external carotid arteries and carotid bulb without sign of dissection,    occlusion or acute thrombosis of    these vessels. No hemodynamically significant stenosis is identified           Intracranial CTA: CTA of the intracranial circulation shows patency of the    distal right and left vertebral arteries, the distal right and left    cervical ICA, the intracranial ICA, and the anterior, middle, and    posterior cerebral arteries bilaterally.  The    basilar artery is also patent.       No flow limiting stenosis identified.                             =====   CONCLUSION:  No acute abnormality seen.           LOCATION:  [facility]           Dictated by (CST): Marcelo Morejon MD on 5/12/2024 at 4:33 PM        Finalized by (CST): Marcelo Morejon MD on 5/12/2024 at 4:40 PM             Labs Reviewed   COMP METABOLIC PANEL (14) - Abnormal; Notable for the following components:       Result Value    Glucose 153 (*)     AST 11 (*)     All other components within normal limits   CBC W/ DIFFERENTIAL - Abnormal; Notable for the following components:    RBC 3.33 (*)     HGB 10.1 (*)     HCT 31.4 (*)     All other components within normal limits   TROPONIN I HIGH SENSITIVITY - Normal   CBC WITH DIFFERENTIAL WITH PLATELET    Narrative:     The following orders were created for panel order CBC With Differential With Platelet.  Procedure                               Abnormality         Status                     ---------                               -----------         ------                     CBC W/ DIFFERENTIAL[[id number]]           Abnormal            Final result                 Please view results for these tests on the individual orders.   RAINBOW DRAW LAVENDER   RAINBOW DRAW LIGHT GREEN   RAINBOW DRAW BLUE     Labs are reassuring.  EKG troponin negative for any pattern of cardiac injury.  Patient to CTA head and neck and MRI also negative for infarct or any acute abnormality see report above for details.  Suspect patient's vertigo is related to peripheral vertigo.  She responded well to meclizine.  I will refer her to ENT.  NSAIDs for cervical strain.  Patient discharged home stable condition.                                           Medical Decision Making      Disposition and Plan     Clinical Impression:  1. Benign paroxysmal positional vertigo, unspecified laterality         Disposition:  Discharge  5/12/2024  6:30 pm    Follow-up:  Hermes Forrest MD  [address]  [place] [address]  [phone]    Follow up            Medications Prescribed:  Current Discharge Medication List        START taking these medications    Details   meclizine 25 MG Oral Tab Take 1 tablet (25 mg total) by mouth 3 (three) times daily as needed.  Qty: 30 tablet, Refills: 0      diclofenac 75 MG Oral Tab EC Take 1 tablet (75 mg total) by mouth 2 (two) times daily.  Qty: 20 tablet, Refills: 0

## 2024-05-12 NOTE — ED INITIAL ASSESSMENT (HPI)
BIBA from home c/o dizziness, vertigo, headache x 2 weeks. Pt states it occurs all the time, to the point she feels like she will pass out. Pt also c/o severe neck pain. Pt states having some blurry vision intermittently.

## 2024-05-13 LAB
ATRIAL RATE: 103 BPM
P AXIS: 62 DEGREES
P-R INTERVAL: 116 MS
Q-T INTERVAL: 340 MS
QRS DURATION: 64 MS
QTC CALCULATION (BEZET): 445 MS
R AXIS: 66 DEGREES
T AXIS: 61 DEGREES
VENTRICULAR RATE: 103 BPM

## 2024-05-27 ENCOUNTER — HOSPITAL ENCOUNTER (EMERGENCY)
Facility: HOSPITAL | Age: 59
Discharge: HOME | End: 2024-05-27
Attending: EMERGENCY MEDICINE

## 2024-05-27 ENCOUNTER — APPOINTMENT (OUTPATIENT)
Dept: CT IMAGING | Facility: HOSPITAL | Age: 59
End: 2024-05-27
Attending: EMERGENCY MEDICINE

## 2024-05-27 VITALS
BODY MASS INDEX: 23.49 KG/M2 | DIASTOLIC BLOOD PRESSURE: 77 MMHG | OXYGEN SATURATION: 98 % | RESPIRATION RATE: 17 BRPM | HEART RATE: 88 BPM | WEIGHT: 141 LBS | HEIGHT: 65 IN | TEMPERATURE: 98 F | SYSTOLIC BLOOD PRESSURE: 138 MMHG

## 2024-05-27 DIAGNOSIS — E87.6 HYPOKALEMIA: ICD-10-CM

## 2024-05-27 DIAGNOSIS — R10.10 UPPER ABDOMINAL PAIN: Primary | ICD-10-CM

## 2024-05-27 LAB
ALBUMIN SERPL-MCNC: 3.8 G/DL (ref 3.4–5)
ALBUMIN/GLOB SERPL: 1.1 {RATIO} (ref 1–2)
ALP LIVER SERPL-CCNC: 69 U/L
ALT SERPL-CCNC: 36 U/L
ANION GAP SERPL CALC-SCNC: 5 MMOL/L (ref 0–18)
AST SERPL-CCNC: 25 U/L (ref 15–37)
BASOPHILS # BLD AUTO: 0.04 X10(3) UL (ref 0–0.2)
BASOPHILS NFR BLD AUTO: 0.5 %
BILIRUB SERPL-MCNC: 0.5 MG/DL (ref 0.1–2)
BILIRUB UR QL STRIP.AUTO: NEGATIVE
BUN BLD-MCNC: 18 MG/DL (ref 9–23)
CALCIUM BLD-MCNC: 9.7 MG/DL (ref 8.5–10.1)
CHLORIDE SERPL-SCNC: 107 MMOL/L (ref 98–112)
CLARITY UR REFRACT.AUTO: CLEAR
CO2 SERPL-SCNC: 27 MMOL/L (ref 21–32)
COLOR UR AUTO: COLORLESS
CREAT BLD-MCNC: 0.76 MG/DL
EGFRCR SERPLBLD CKD-EPI 2021: 89 ML/MIN/1.73M2 (ref 60–?)
EOSINOPHIL # BLD AUTO: 0.09 X10(3) UL (ref 0–0.7)
EOSINOPHIL NFR BLD AUTO: 1.1 %
ERYTHROCYTE [DISTWIDTH] IN BLOOD BY AUTOMATED COUNT: 13.4 %
GLOBULIN PLAS-MCNC: 3.4 G/DL (ref 2.8–4.4)
GLUCOSE BLD-MCNC: 108 MG/DL (ref 70–99)
GLUCOSE UR STRIP.AUTO-MCNC: NORMAL MG/DL
HCT VFR BLD AUTO: 42.6 %
HGB BLD-MCNC: 14.3 G/DL
IMM GRANULOCYTES # BLD AUTO: 0.01 X10(3) UL (ref 0–1)
IMM GRANULOCYTES NFR BLD: 0.1 %
KETONES UR STRIP.AUTO-MCNC: 20 MG/DL
LEUKOCYTE ESTERASE UR QL STRIP.AUTO: NEGATIVE
LIPASE SERPL-CCNC: 51 U/L (ref 13–75)
LYMPHOCYTES # BLD AUTO: 2.97 X10(3) UL (ref 1–4)
LYMPHOCYTES NFR BLD AUTO: 35.4 %
MAGNESIUM SERPL-MCNC: 2 MG/DL (ref 1.6–2.6)
MCH RBC QN AUTO: 30.9 PG (ref 26–34)
MCHC RBC AUTO-ENTMCNC: 33.6 G/DL (ref 31–37)
MCV RBC AUTO: 92 FL
MONOCYTES # BLD AUTO: 0.66 X10(3) UL (ref 0.1–1)
MONOCYTES NFR BLD AUTO: 7.9 %
NEUTROPHILS # BLD AUTO: 4.61 X10 (3) UL (ref 1.5–7.7)
NEUTROPHILS # BLD AUTO: 4.61 X10(3) UL (ref 1.5–7.7)
NEUTROPHILS NFR BLD AUTO: 55 %
NITRITE UR QL STRIP.AUTO: NEGATIVE
OSMOLALITY SERPL CALC.SUM OF ELEC: 290 MOSM/KG (ref 275–295)
PH UR STRIP.AUTO: 6 [PH] (ref 5–8)
PLATELET # BLD AUTO: 204 10(3)UL (ref 150–450)
POTASSIUM SERPL-SCNC: 3.4 MMOL/L (ref 3.5–5.1)
PROT SERPL-MCNC: 7.2 G/DL (ref 6.4–8.2)
PROT UR STRIP.AUTO-MCNC: NEGATIVE MG/DL
RBC # BLD AUTO: 4.63 X10(6)UL
RBC UR QL AUTO: NEGATIVE
SODIUM SERPL-SCNC: 139 MMOL/L (ref 136–145)
SP GR UR STRIP.AUTO: >1.03 (ref 1–1.03)
UROBILINOGEN UR STRIP.AUTO-MCNC: NORMAL MG/DL
WBC # BLD AUTO: 8.4 X10(3) UL (ref 4–11)

## 2024-05-27 PROCEDURE — 99285 EMERGENCY DEPT VISIT HI MDM: CPT

## 2024-05-27 PROCEDURE — 85025 COMPLETE CBC W/AUTO DIFF WBC: CPT | Performed by: EMERGENCY MEDICINE

## 2024-05-27 PROCEDURE — 93010 ELECTROCARDIOGRAM REPORT: CPT

## 2024-05-27 PROCEDURE — 83735 ASSAY OF MAGNESIUM: CPT | Performed by: EMERGENCY MEDICINE

## 2024-05-27 PROCEDURE — 74177 CT ABD & PELVIS W/CONTRAST: CPT | Performed by: EMERGENCY MEDICINE

## 2024-05-27 PROCEDURE — 96360 HYDRATION IV INFUSION INIT: CPT

## 2024-05-27 PROCEDURE — 80053 COMPREHEN METABOLIC PANEL: CPT | Performed by: EMERGENCY MEDICINE

## 2024-05-27 PROCEDURE — 93005 ELECTROCARDIOGRAM TRACING: CPT

## 2024-05-27 PROCEDURE — 83690 ASSAY OF LIPASE: CPT | Performed by: EMERGENCY MEDICINE

## 2024-05-27 PROCEDURE — 81003 URINALYSIS AUTO W/O SCOPE: CPT | Performed by: EMERGENCY MEDICINE

## 2024-05-27 RX ORDER — POTASSIUM CHLORIDE 20 MEQ/1
40 TABLET, EXTENDED RELEASE ORAL ONCE
Status: COMPLETED | OUTPATIENT
Start: 2024-05-27 | End: 2024-05-27

## 2024-05-27 RX ORDER — OMEPRAZOLE 40 MG/1
40 CAPSULE, DELAYED RELEASE ORAL DAILY
Qty: 30 CAPSULE | Refills: 0 | Status: SHIPPED | OUTPATIENT
Start: 2024-05-27 | End: 2024-06-26

## 2024-05-28 LAB
ATRIAL RATE: 105 BPM
P AXIS: 71 DEGREES
P-R INTERVAL: 122 MS
Q-T INTERVAL: 344 MS
QRS DURATION: 76 MS
QTC CALCULATION (BEZET): 454 MS
R AXIS: 69 DEGREES
T AXIS: 59 DEGREES
VENTRICULAR RATE: 105 BPM

## 2024-05-28 NOTE — ED PROVIDER NOTES
Patient Seen in: [facility] Emergency Department      History     Chief Complaint   Patient presents with    Abdomen/Flank Pain    Dizziness    Back Pain    Neck Pain     Stated Complaint: abdominal pain, weakness, hx of low hgb, jittery and dizzy    Subjective:   HPI    This is a 59-year-old female presents emergency room with multiple complaints, states that the last few days she has been having some upper abdominal discomfort which describes as burning sensation, denies nausea vomiting or diarrhea, denies melena or hematochezia.  Denies chest pain or shortness of breath.  Denies any tearing type chest or abdominal pain.  Denies back or flank pain.  Denies any fevers or chills.  Patient states that she has a history of anemia, last hemoglobin was noted to be 10 and she was instructed to get it rechecked this week.  Patient also reports she has chronic neck pain and feels generalized weakness and fatigue denies any focal weakness.  Has had occasional dizziness denies any at this time.  Patient was seen in the emergency room for dizziness recently, CTA and MRI were unremarkable.  Patient did follow-up with primary care after ER visit.  Patient denies any acute change of the symptoms.  Denies difficulty with fine motor movements.  Patient reports she has been in bed because and feeling generalized weakness, is able to ambulate at home,  at bedside states she does ambulate the steady gait to go to the washroom and eat.  She has had no falls.    Objective:   Past Medical History:    Anxiety    Anxiety state    Arthritis    Rt. Knee    Back problem    neck pain    Breast cancer (HCC)    4/14'-invasive ductal carcinoma    Disorder of thyroid    Exposure to radiation    High cholesterol    Under control    Hyperlipidemia    Hypothyroidism    Pain in joints    Neck pain    Presence of other cardiac implants and grafts    Breast implants/dbl. Mastectomy    Visual impairment    glasses    Vitamin D deficiency     Wears glasses    I wear glasses              Past Surgical History:   Procedure Laterality Date    Bx breast percut w/device  3/10/14 [place] [facility]    Right breast, two sites          Times 1    Colonoscopy  6 years ago    Fluor gid & loclzj ndl/cath spi dx/ther njx N/A 10/15/2015    Procedure: CERVICAL EPIDURAL;  Surgeon: Macario Obrien MD;  Location: [facility]    Injection, w/wo contrast, dx/therapeutic substance, epidural/subarachnoid; cervical/thoracic N/A 10/15/2015    Procedure: CERVICAL EPIDURAL;  Surgeon: Macario Obrien MD;  Location: [facility]    Lumpectomy right  '    invasive ductal carcinoma    M-sedaj by sm phys perfrmg svc 5+ yr N/A 10/15/2015    Procedure: CERVICAL EPIDURAL;  Surgeon: Macario Obrien MD;  Location: [facility]    Lidya localization wire 1 site left (cpt=19281)  '    ADH    Mastectomy left  2017    Mastectomy right  2017    Oral surgery procedure      [place] Teeth Extraction    Other surgical history  2010    Bilateral Lasik surgery     Other surgical history  4/10/14 [facility]    Right Breast Lumpectomy w/SLN bx; Left Exc of ADH; all under needle localization    Other surgical history  2017    Breast Implantation    Other surgical history  2017    Liposuction for Breast implantstion area    Other surgical history      Tummy tuck    Other surgical history  2017    Fat grafting on lthe left side. nipple reconstruction on the left breast    Radiation right  14'    invasive ductal carcinoma    Reduction left  05'    Reduction right  05'    Tonsillectomy                  Social History     Socioeconomic History    Marital status:     Number of children: 1   Occupational History    Occupation: retired    Tobacco Use    Smoking status: Former     Current packs/day: 0.00     Average packs/day: 1 pack/day for 15.0 years (15.0 ttl pk-yrs)     Types: Cigarettes     Start date: 1993      Quit date: 2008     Years since quittin.4    Smokeless tobacco: Never   Vaping Use    Vaping status: Never Used   Substance and Sexual Activity    Alcohol use: Not Currently     Comment: social, rare 1x/month    Drug use: Not Currently     Types: Cannabis     Comment: In my teen years    Sexual activity: Yes     Partners: Male     Comment: none   Other Topics Concern     Service No    Blood Transfusions No    Caffeine Concern Yes     Comment: 1 cup daily     Occupational Exposure No    Hobby Hazards No    Sleep Concern Yes    Stress Concern No    Weight Concern No    Special Diet No    Back Care No    Exercise Yes     Comment: walks daily   Social History Narrative    Lives in [place] with  and 16 y/o Son.     Social Determinants of Health     Financial Resource Strain: Low Risk  (2022)    [facility]    Overall Financial Resource Strain (CARDIA)     Difficulty of Paying Living Expenses: Not hard at all   Food Insecurity: No Food Insecurity (2022)    Received from [facility], [facility]    Hunger Vital Sign     Worried About Running Out of Food in the Last Year: Never true     Ran Out of Food in the Last Year: Never true   Transportation Needs: No Transportation Needs (2022)    Received from [facility], [facility]    PRAPARE - Transportation     Lack of Transportation (Medical): No     Lack of Transportation (Non-Medical): No   Physical Activity: Insufficiently Active (2022)    Exercise Vital Sign     Days of Exercise per Week: 4 days     Minutes of Exercise per Session: 30 min   Stress: Stress Concern Present (2022)    Received from [facility], [facility]    [place] [place] [employer] - Occupational Stress Questionnaire     Feeling of Stress : To some extent   Social Connections: Moderately Isolated (2022)    Received from [facility]  [facility], [facility]    Social Connection and Isolation Panel [NHANES]     Frequency of Communication with Friends and Family: Three times a week     Frequency of Social Gatherings with Friends and Family: Once a week     Attends Hinduism Services: Never     Active Member of Clubs or Organizations: No     Attends Club or Organization Meetings: Never     Marital Status:    Housing Stability: Low Risk  (1/21/2022)    Received from [facility], [facility]    Housing Stability Vital Sign     Unable to Pay for Housing in the Last Year: No     Number of Places Lived in the Last Year: 1     Unstable Housing in the Last Year: No              Review of Systems    Positive for stated complaint: abdominal pain, weakness, hx of low hgb, jittery and dizzy  Other systems are as noted in HPI.  Constitutional and vital signs reviewed.      All other systems reviewed and negative except as noted above.    Physical Exam     ED Triage Vitals [05/27/24 1539]   /88   Pulse 116   Resp 20   Temp 98.2 °F (36.8 °C)   Temp src Oral   SpO2 99 %   O2 Device None (Room air)       Current Vitals:   Vital Signs  BP: 138/77  Pulse: 88  Resp: 17  Temp: 98.2 °F (36.8 °C)  Temp src: Oral  MAP (mmHg): 95    Oxygen Therapy  SpO2: 98 %  O2 Device: None (Room air)            Physical Exam    GENERAL: Patient is awake, alert, well-appearing, in no acute distress.  HEENT:  no scleral icterus.  Mucous membranes are moist, oropharynx is clear, uvula midline. .Scalp is atraumatic.  NECK: Nec no midline cervical spine tenderness.  k is supple, there is no nuchal rigidity.  No carotid bruits.  No masses.  Trachea midline.  No cervical lymphadenopathy.  HEART: Regular rate and rhythm, no murmurs.  LUNGS: Clear to auscultation bilaterally.  No Rales, no rhonchi, no wheezing, no stridor.  ABDOMEN: Soft, nondistended, mild epigastric tender, bowel sounds are present, no rebound, no rigidity, no guarding.no pulsatile masses. No  CVA tenderness  EXTREMITIES: No peripheral edema, no calf tenderness, dorsal pedal pulses present and equal bilaterally.  SKIN: Warm, dry, intact, no rashes.  NEUROLOGIC EXAM: Tongue midline, no facial drooping, no ptosis, muscle strength +5/5 bilateral upper and lower extremities cerebellar finger to nose intact, no pronator drift, sensation intact.  Ambulated to washroom with steady gait.        ED Course     Labs Reviewed   COMP METABOLIC PANEL (14) - Abnormal; Notable for the following components:       Result Value    Glucose 108 (*)     Potassium 3.4 (*)     All other components within normal limits   URINALYSIS WITH CULTURE REFLEX - Abnormal; Notable for the following components:    Urine Color Colorless (*)     Spec Gravity >1.030 (*)     Ketones Urine 20 (*)     All other components within normal limits   LIPASE - Normal   MAGNESIUM - Normal   CBC WITH DIFFERENTIAL WITH PLATELET    Narrative:     The following orders were created for panel order CBC With Differential With Platelet.  Procedure                               Abnormality         Status                     ---------                               -----------         ------                     CBC W/ DIFFERENTIAL[[id number]]                              Final result                 Please view results for these tests on the individual orders.   RAINBOW DRAW LAVENDER   RAINBOW DRAW LIGHT GREEN   CBC W/ DIFFERENTIAL     EKG    Rate, intervals and axes as noted on EKG Report.  Rate: 105  Rhythm: Sinus Rhythm  Reading: Sinus tachycardia, no ST elevation.  No change compared to previous EKG dated May 12, 2024                          MDM        Differential diagnosis before testing includes but not limited to GERD, gastritis, peptic ulcer disease, perforated viscus, pancreatitis, colitis/cholecystitis, electrolyte abnormality, which is a medical condition that poses a threat to life/function    Radiographic images  I personally reviewed the radiographs  and my individual interpretation shows CT abdomen no free air  I also reviewed the official reports that showed CT abdomen no acute intra-abdominal pathology    External chart review included primary care notes reviewed, patient also was noted to have history of generalized anxiety disorder as well as was given referral to neurology for episodes of dizziness symptoms.    Medications Provided: Potassium    Course of Events during Emergency Room Visit include IV established blood work obtained.  CBC was unremarkable, chemistry unremarkable other than potassium slightly low at 3.4.  Was given oral potassium.  Lipase unremarkable.  Patient also given IV fluid hydration.  CT of the abdomen without acute findings.  On reevaluation patient states she does feel much better, abdomen soft nontender nonsurgical, discussed possibility of GERD versus gastritis will start PPI.  Follow-up with primary care and GI.  Patient reports she was anemic in the past, today's hemoglobin is 14.3.  Patient does report history of left-sided neck pain rating down the arm, was told she needs an MRI, discussed with patient she does not need this emergently has normal neurologic exam and this can be done as outpatient, patient agrees with this plan.  Return to ER if any change or symptoms.  Patient well-appearing discharged good condition.  Vital stable.    Shared decision making was utilized         Discharge  I have discussed with the patient the results of test, differential diagnosis, treatment plan, warning signs and symptoms which should prompt immediate return.  They expressed understanding of these instructions and agrees to the following plan provided.  They were given written discharge instructions and agrees to return for any concerns and voiced understanding and all questions were answered.    Note to patient: The 21st Century Cures Act makes medical notes like these available to patients in the interest of transparency. However, this  is a medical document intended as peer to peer communication. It is written in medical language and may contain abbreviations or verbiage that are unfamiliar. It may appear blunt or direct. Medical documents are intended to carry relevant information, facts as evident, and the clinical opinion of the practitioner.                                            Medical Decision Making      Disposition and Plan     Clinical Impression:  1. Upper abdominal pain    2. Hypokalemia         Disposition:  Discharge  5/27/2024  7:47 pm    Follow-up:  George Johnson MD  [address] [facility] [address]  [place] [address] [phone]    Follow up in 2 day(s)      Juwan Lr MD  [address] [facility]  [facility] [address] [phone]    Follow up in 2 day(s)            Medications Prescribed:  Current Discharge Medication List        START taking these medications    Details   Omeprazole 40 MG Oral Capsule Delayed Release Take 1 capsule (40 mg total) by mouth daily.  Qty: 30 capsule, Refills: 0

## 2024-06-08 ENCOUNTER — APPOINTMENT (OUTPATIENT)
Dept: GENERAL RADIOLOGY | Facility: HOSPITAL | Age: 59
End: 2024-06-08
Attending: EMERGENCY MEDICINE
Payer: COMMERCIAL

## 2024-06-08 ENCOUNTER — APPOINTMENT (OUTPATIENT)
Dept: CT IMAGING | Facility: HOSPITAL | Age: 59
End: 2024-06-08
Attending: EMERGENCY MEDICINE
Payer: COMMERCIAL

## 2024-06-08 PROCEDURE — 71275 CT ANGIOGRAPHY CHEST: CPT | Performed by: EMERGENCY MEDICINE

## 2024-06-08 PROCEDURE — 71045 X-RAY EXAM CHEST 1 VIEW: CPT | Performed by: EMERGENCY MEDICINE

## 2024-06-16 PROBLEM — F33.3 MDD (MAJOR DEPRESSIVE DISORDER), RECURRENT, SEVERE, WITH PSYCHOSIS (HCC): Status: ACTIVE | Noted: 2024-06-16

## 2024-07-01 PROBLEM — F33.2 MDD (MAJOR DEPRESSIVE DISORDER), RECURRENT EPISODE, SEVERE (HCC): Status: ACTIVE | Noted: 2024-07-01

## 2024-07-31 PROBLEM — F33.2 MDD (MAJOR DEPRESSIVE DISORDER), RECURRENT SEVERE, WITHOUT PSYCHOSIS (HCC): Status: ACTIVE | Noted: 2024-07-01

## 2024-10-22 ENCOUNTER — LAB ENCOUNTER (OUTPATIENT)
Dept: LAB | Age: 59
End: 2024-10-22
Payer: COMMERCIAL

## 2024-10-22 DIAGNOSIS — Z79.899 MEDICATION MANAGEMENT: ICD-10-CM

## 2024-10-22 LAB
ALBUMIN SERPL-MCNC: 4.9 G/DL (ref 3.2–4.8)
ALBUMIN/GLOB SERPL: 2 {RATIO} (ref 1–2)
ALP LIVER SERPL-CCNC: 107 U/L
ALT SERPL-CCNC: 57 U/L
ANION GAP SERPL CALC-SCNC: 7 MMOL/L (ref 0–18)
AST SERPL-CCNC: 36 U/L (ref ?–34)
BASOPHILS # BLD AUTO: 0.09 X10(3) UL (ref 0–0.2)
BASOPHILS NFR BLD AUTO: 1 %
BILIRUB SERPL-MCNC: 0.6 MG/DL (ref 0.2–1.1)
BUN BLD-MCNC: 16 MG/DL (ref 9–23)
CALCIUM BLD-MCNC: 10.5 MG/DL (ref 8.7–10.4)
CHLORIDE SERPL-SCNC: 105 MMOL/L (ref 98–112)
CHOLEST SERPL-MCNC: 230 MG/DL (ref ?–200)
CO2 SERPL-SCNC: 27 MMOL/L (ref 21–32)
CREAT BLD-MCNC: 0.81 MG/DL
EGFRCR SERPLBLD CKD-EPI 2021: 83 ML/MIN/1.73M2 (ref 60–?)
EOSINOPHIL # BLD AUTO: 0.29 X10(3) UL (ref 0–0.7)
EOSINOPHIL NFR BLD AUTO: 3.3 %
ERYTHROCYTE [DISTWIDTH] IN BLOOD BY AUTOMATED COUNT: 13.2 %
FASTING PATIENT LIPID ANSWER: YES
FASTING STATUS PATIENT QL REPORTED: YES
GLOBULIN PLAS-MCNC: 2.5 G/DL (ref 2–3.5)
GLUCOSE BLD-MCNC: 93 MG/DL (ref 70–99)
HCT VFR BLD AUTO: 44 %
HDLC SERPL-MCNC: 75 MG/DL (ref 40–59)
HGB BLD-MCNC: 14.4 G/DL
IMM GRANULOCYTES # BLD AUTO: 0.02 X10(3) UL (ref 0–1)
IMM GRANULOCYTES NFR BLD: 0.2 %
LDLC SERPL CALC-MCNC: 139 MG/DL (ref ?–100)
LYMPHOCYTES # BLD AUTO: 1.96 X10(3) UL (ref 1–4)
LYMPHOCYTES NFR BLD AUTO: 22.5 %
MCH RBC QN AUTO: 31.1 PG (ref 26–34)
MCHC RBC AUTO-ENTMCNC: 32.7 G/DL (ref 31–37)
MCV RBC AUTO: 95 FL
MONOCYTES # BLD AUTO: 0.49 X10(3) UL (ref 0.1–1)
MONOCYTES NFR BLD AUTO: 5.6 %
NEUTROPHILS # BLD AUTO: 5.87 X10 (3) UL (ref 1.5–7.7)
NEUTROPHILS # BLD AUTO: 5.87 X10(3) UL (ref 1.5–7.7)
NEUTROPHILS NFR BLD AUTO: 67.4 %
NONHDLC SERPL-MCNC: 155 MG/DL (ref ?–130)
OSMOLALITY SERPL CALC.SUM OF ELEC: 289 MOSM/KG (ref 275–295)
PLATELET # BLD AUTO: 223 10(3)UL (ref 150–450)
POTASSIUM SERPL-SCNC: 4 MMOL/L (ref 3.5–5.1)
PROT SERPL-MCNC: 7.4 G/DL (ref 5.7–8.2)
RBC # BLD AUTO: 4.63 X10(6)UL
SODIUM SERPL-SCNC: 139 MMOL/L (ref 136–145)
TRIGL SERPL-MCNC: 92 MG/DL (ref 30–149)
VLDLC SERPL CALC-MCNC: 17 MG/DL (ref 0–30)
WBC # BLD AUTO: 8.7 X10(3) UL (ref 4–11)

## 2024-10-22 PROCEDURE — 85025 COMPLETE CBC W/AUTO DIFF WBC: CPT

## 2024-10-22 PROCEDURE — 80053 COMPREHEN METABOLIC PANEL: CPT

## 2024-10-22 PROCEDURE — 36415 COLL VENOUS BLD VENIPUNCTURE: CPT

## 2024-10-22 PROCEDURE — 80061 LIPID PANEL: CPT

## 2025-01-30 ENCOUNTER — OFFICE VISIT (OUTPATIENT)
Dept: FAMILY MEDICINE CLINIC | Facility: CLINIC | Age: 60
End: 2025-01-30
Payer: COMMERCIAL

## 2025-01-30 VITALS
BODY MASS INDEX: 27 KG/M2 | WEIGHT: 164 LBS | DIASTOLIC BLOOD PRESSURE: 80 MMHG | TEMPERATURE: 98 F | SYSTOLIC BLOOD PRESSURE: 124 MMHG | RESPIRATION RATE: 16 BRPM | OXYGEN SATURATION: 98 % | HEART RATE: 91 BPM

## 2025-01-30 DIAGNOSIS — R21 ACUTE MACULOPAPULAR RASH: Primary | ICD-10-CM

## 2025-01-30 PROCEDURE — 99213 OFFICE O/P EST LOW 20 MIN: CPT

## 2025-01-30 NOTE — PROGRESS NOTES
Subjective:   Patient ID: Mary Ellen Alvarado is a 59 year old female.    Rash  This is a new problem. Episode onset: 2 days ago. The problem is unchanged. The affected locations include the abdomen, back, left upper leg, right arm and right upper leg. The rash is characterized by burning. She was exposed to a new animal (new cat 2 months ago). Associated symptoms include congestion. Past treatments include nothing.     Denies recent travel. States has been having congestion, was worked up with echo yesterday as well as recent chest x-ray. States her congestion is chronic.    History/Other:   Review of Systems   HENT:  Positive for congestion.    Eyes: Negative.    Cardiovascular: Negative.    Gastrointestinal: Negative.    Endocrine: Negative.    Genitourinary: Negative.    Musculoskeletal: Negative.    Skin:  Positive for rash.   Allergic/Immunologic: Negative.    Neurological: Negative.    Hematological: Negative.    Psychiatric/Behavioral: Negative.       Current Outpatient Medications   Medication Sig Dispense Refill    LORazepam 0.5 MG Oral Tab Take 0.5 tablets (0.25 mg total) by mouth 2 (two) times daily as needed for Anxiety. 30 tablet 0    temazepam 15 MG Oral Cap TAKE ONE CAPSULE BY MOUTH NIGHTLY AS NEEDED FOR SLEEP 30 capsule 0    sertraline 50 MG Oral Tab Take 1 tablet (50 mg total) by mouth daily. (Patient not taking: Reported on 1/30/2025) 30 tablet 0    traZODone 50 MG Oral Tab Take 1 tablet (50 mg total) by mouth nightly. (Patient taking differently: Take 0.5 tablets (25 mg total) by mouth nightly.) 30 tablet 0    rosuvastatin 5 MG Oral Tab Take 1 tablet (5 mg total) by mouth nightly.      LEVOTHYROXINE SODIUM 88 MCG Oral Tab TAKE 1 TABLET(88 MCG) BY MOUTH BEFORE BREAKFAST 90 tablet 3    Multiple Vitamin Oral Tab Take 1 tablet by mouth daily.      Calcium Carbonate-Vitamin D (CALCIUM-D) 600-400 MG-UNIT Oral Tab Take by mouth daily.         Allergies:Allergies[1]    Objective:   Physical  Exam  Constitutional:       General: She is not in acute distress.     Appearance: She is not ill-appearing.   HENT:      Head: Normocephalic.      Nose: Congestion present.   Eyes:      Pupils: Pupils are equal, round, and reactive to light.   Pulmonary:      Effort: Pulmonary effort is normal. No respiratory distress.   Abdominal:      Palpations: Abdomen is soft.   Musculoskeletal:         General: Normal range of motion.      Cervical back: Normal range of motion.   Skin:     General: Skin is warm.      Findings: Rash present.      Comments: Erythematous maculopapular rash to abdomen, back, bilateral anterior thighs, right upper arm   Neurological:      Mental Status: She is alert and oriented to person, place, and time.   Psychiatric:         Mood and Affect: Mood normal.         Assessment & Plan:   1. Acute maculopapular rash      Likely viral origin given congestion and shortness of breath. Plan to use OTC Aveeno oatmeal bath, Zyrtec BID, and cool compresses. Encouraged dermatology follow up if not improving or worsening.       No orders of the defined types were placed in this encounter.      Meds This Visit:  Requested Prescriptions      No prescriptions requested or ordered in this encounter       Imaging & Referrals:  None         [1]   Allergies  Allergen Reactions    Epinephrine ANAPHYLAXIS

## 2025-04-12 ENCOUNTER — OFFICE VISIT (OUTPATIENT)
Dept: FAMILY MEDICINE CLINIC | Facility: CLINIC | Age: 60
End: 2025-04-12
Payer: COMMERCIAL

## 2025-04-12 VITALS
OXYGEN SATURATION: 98 % | TEMPERATURE: 98 F | BODY MASS INDEX: 29 KG/M2 | WEIGHT: 172 LBS | RESPIRATION RATE: 16 BRPM | HEART RATE: 88 BPM | SYSTOLIC BLOOD PRESSURE: 100 MMHG | DIASTOLIC BLOOD PRESSURE: 80 MMHG

## 2025-04-12 DIAGNOSIS — J02.9 ACUTE PHARYNGITIS, UNSPECIFIED ETIOLOGY: Primary | ICD-10-CM

## 2025-04-12 LAB
CONTROL LINE PRESENT WITH A CLEAR BACKGROUND (YES/NO): YES YES/NO
KIT LOT #: NORMAL NUMERIC
STREP GRP A CUL-SCR: NEGATIVE

## 2025-04-12 PROCEDURE — 3079F DIAST BP 80-89 MM HG: CPT | Performed by: NURSE PRACTITIONER

## 2025-04-12 PROCEDURE — 87880 STREP A ASSAY W/OPTIC: CPT | Performed by: NURSE PRACTITIONER

## 2025-04-12 PROCEDURE — 99213 OFFICE O/P EST LOW 20 MIN: CPT | Performed by: NURSE PRACTITIONER

## 2025-04-12 PROCEDURE — 3074F SYST BP LT 130 MM HG: CPT | Performed by: NURSE PRACTITIONER

## 2025-04-12 NOTE — PATIENT INSTRUCTIONS
Supportive care with OTC medications.  May take Tylenol/Ibuprofen for pain.  Increase oral intake of warm fluids, such as hot tea with honey and lemon.  May gargle with warm salt water.  Taking honey can help with cough.   May use Nedi-pot with distilled water only.  Encouraged to use Debrox for cerumen.  Start taking a decongestant and Flonase.   Most viral illnesses take 7-14 days to get better. Usually, day 10 is going to be your worst. After day 10 symptoms start to subside.  Some viral illnesses can have a cough that can last up to 3 weeks.   Antibiotic do not help with viral infection, only bacterial infection.   Rest, stay away from others and wash your hands.  If symptoms do not get better around after day 10 or are worse please follow up with your PCP or RTC.

## 2025-04-12 NOTE — PROGRESS NOTES
CHIEF COMPLAINT:     Chief Complaint   Patient presents with    Cough     Fever and dry cough and hurts to swallow - Entered by patient x Thursday        HPI:   Mary Ellen Alvarado is a 60 year old female who presents with c/o productive cough with yellow sputum, sore throat and fever that started on Thursday.   Max temperature: 102.   Denies: sob, wheezing, nasal congestion, post nasal drainage, runny nose, sinus pressure/pain, post nasal drainage, ear pressure/pain, muffled hearing, popping/crackling w/swallowing, inability to swallow, drooling, bad breath, change in voice, HA, n/v/d, cp, change in behavior/appetite/sleep, fatigue, body aches or exposure to anyone sick  OTC: Tylenol and Flonase  COVID: negative at home    Current Medications[1]   Past Medical History[2]   Past Surgical History[3]      Short Social Hx on File[4]      REVIEW OF SYSTEMS:   GENERAL: Unchanged appetite  SKIN: no rashes or abnormal skin lesions  HEENT: See HPI  LUNGS: denies shortness of breath or wheezing, See HPI  CARDIOVASCULAR: denies chest pain or palpitations   GI: denies N/V/C or abdominal pain  NEURO: Denies dizziness or weakness    EXAM:   There were no vitals taken for this visit.  GENERAL: well developed, well nourished,in no apparent distress  SKIN: no rashes,no suspicious lesions  HEAD: atraumatic, normocephalic.  Denies tenderness on palpation of maxillary/frontal sinuses  EYES: conjunctiva clear, EOM intact  EARS: TM's pearly/gray, no bulging, no retraction, no fluid, bony landmarks present. +TM fullness bilaterally.   NOSE: Nostrils patent, clear nasal discharge, nasal mucosa red.   THROAT: Oral mucosa pink, moist. Posterior pharynx is pink. No exudates. Tonsils 1/4. +Post nasal drainage.    NECK: Supple, non-tender  LUNGS: clear to auscultation bilaterally, no wheezes or rhonchi. Breathing is non labored.  CARDIO: RRR without murmur  EXTREMITIES: no cyanosis, clubbing or edema  LYMPH:  No head or neck lymphadenopathy.       Results for orders placed or performed in visit on 04/12/25   Strep A Assay W/Optic    Collection Time: 04/12/25 11:19 AM   Result Value Ref Range    Strep Grp A Screen Negative Negative    Control Line Present with a clear background (yes/no) Yes Yes/No    Kit Lot # [id number] Numeric    Kit Expiration Date 12/20/25 Date       ASSESSMENT AND PLAN:   Mary Ellen Alvarado is a 60 year old female who presents with upper respiratory symptoms that are consistent with    ASSESSMENT:   Encounter Diagnosis   Name Primary?    Acute pharyngitis, unspecified etiology Yes         PLAN:     Comfort care per pt instructions.   To follow up for any new or worsening symptoms.   To go to the ER for any severe symptoms such as difficulty breathing or chest pain.     Meds & Refills for this Visit:  Requested Prescriptions      No prescriptions requested or ordered in this encounter       Risks, benefits, and side effects of medication explained and discussed.    Patient Instructions   Supportive care with OTC medications.  May take Tylenol/Ibuprofen for pain.  Increase oral intake of warm fluids, such as hot tea with honey and lemon.  May gargle with warm salt water.  Taking honey can help with cough.   May use Nedi-pot with distilled water only.  Encouraged to use Debrox for cerumen.  Start taking a decongestant and Flonase.   Most viral illnesses take 7-14 days to get better. Usually, day 10 is going to be your worst. After day 10 symptoms start to subside.  Some viral illnesses can have a cough that can last up to 3 weeks.   Antibiotic do not help with viral infection, only bacterial infection.   Rest, stay away from others and wash your hands.  If symptoms do not get better around after day 10 or are worse please follow up with your PCP or RTC.    The patient indicates understanding of these issues and agrees to the plan.  The patient is asked to return if sx's persist or worsen.         [1]   Current Outpatient Medications    Medication Sig Dispense Refill    LORazepam 0.5 MG Oral Tab Take 0.5 tablets (0.25 mg total) by mouth 2 (two) times daily as needed for Anxiety. 30 tablet 0    traZODone 25 mg Oral Tab Take 12.5 mg by mouth nightly.      temazepam 7.5 MG Oral Cap Take 1 capsule (7.5 mg total) by mouth nightly. 30 capsule 0    rosuvastatin 5 MG Oral Tab Take 1 tablet (5 mg total) by mouth nightly.      LEVOTHYROXINE SODIUM 88 MCG Oral Tab TAKE 1 TABLET(88 MCG) BY MOUTH BEFORE BREAKFAST 90 tablet 3    Multiple Vitamin Oral Tab Take 1 tablet by mouth daily.      Calcium Carbonate-Vitamin D (CALCIUM-D) 600-400 MG-UNIT Oral Tab Take by mouth daily.       [2]   Past Medical History:   Anxiety    Anxiety state    Arthritis    Rt. Knee    Back problem    neck pain    Breast cancer (HCC)    '-invasive ductal carcinoma    Disorder of thyroid    Exposure to radiation    High cholesterol    Under control    Hyperlipidemia    Hypothyroidism    Pain in joints    Neck pain    Presence of other cardiac implants and grafts    Breast implants/dbl. Mastectomy    Visual impairment    glasses    Vitamin D deficiency    Wears glasses    I wear glasses   [3]   Past Surgical History:  Procedure Laterality Date    Bx breast percut w/device  3/10/14 [facility]    Right breast, two sites          Times 1    Colonoscopy  6 years ago    Fluor gid & loclzj ndl/cath spi dx/ther njx N/A 10/15/2015    Procedure: CERVICAL EPIDURAL;  Surgeon: Macario Obrien MD;  Location: [facility]    Injection, w/wo contrast, dx/therapeutic substance, epidural/subarachnoid; cervical/thoracic N/A 10/15/2015    Procedure: CERVICAL EPIDURAL;  Surgeon: Macario Obrien MD;  Location: [facility]    Lumpectomy right  '    invasive ductal carcinoma    M-sedaj by sm phys perfrmg svc 5+ yr N/A 10/15/2015    Procedure: CERVICAL EPIDURAL;  Surgeon: Macario Obrien MD;  Location: [facility]    Lidya  localization wire 1 site left (cpt=19281)  '    ADH    Mastectomy left  2017    Mastectomy right  2017    Oral surgery procedure      [place] Teeth Extraction    Other surgical history      Bilateral Lasik surgery     Other surgical history  4/10/14 [facility]    Right Breast Lumpectomy w/SLN bx; Left Exc of ADH; all under needle localization    Other surgical history  2017    Breast Implantation    Other surgical history  2017    Liposuction for Breast implantstion area    Other surgical history      Tummy tuck    Other surgical history  2017    Fat grafting on lthe left side. nipple reconstruction on the left breast    Radiation right  14'    invasive ductal carcinoma    Reduction left  05'    Reduction right  05'    Tonsillectomy     [4]   Social History  Socioeconomic History    Marital status:     Number of children: 1   Occupational History    Occupation: retired    Tobacco Use    Smoking status: Former     Current packs/day: 0.00     Average packs/day: 1 pack/day for 15.0 years (15.0 ttl pk-yrs)     Types: Cigarettes     Start date: 1993     Quit date: 2008     Years since quittin.2    Smokeless tobacco: Never   Vaping Use    Vaping status: Never Used   Substance and Sexual Activity    Alcohol use: Not Currently     Comment: social, rare 1x/month    Drug use: Not Currently     Types: Cannabis     Comment: In my teen years - THC gummy last night to sleep    Sexual activity: Yes     Partners: Male     Comment: none   Other Topics Concern     Service No    Blood Transfusions No    Caffeine Concern Yes     Comment: 1 cup daily     Occupational Exposure No    Hobby Hazards No    Sleep Concern Yes    Stress Concern No    Weight Concern No    Special Diet No    Back Care No    Exercise Yes     Comment: walks daily   Social History Narrative    Lives in [place] with  and 18 y/o Son.     Social Drivers of Health     Food Insecurity: No Food Insecurity (2024)     Food Insecurity     Food Insecurity: Never true   Transportation Needs: No Transportation Needs (7/8/2024)    Transportation Needs     Lack of Transportation: No   Stress: Stress Concern Present (1/21/2022)    Received from [facility]    [facility] [place] [facility] Occupational Health - Occupational Stress Questionnaire     Feeling of Stress : To some extent   Housing Stability: Low Risk  (7/8/2024)    Housing Stability     Housing Instability: No

## (undated) DEVICE — KENDALL SCD EXPRESS SLEEVES, KNEE LENGTH, MEDIUM: Brand: KENDALL SCD

## (undated) DEVICE — 3M™ TEGADERM™ TRANSPARENT FILM DRESSING, 1626W, 4 IN X 4-3/4 IN (10 CM X 12 CM), 50 EACH/CARTON, 4 CARTON/CASE: Brand: 3M™ TEGADERM™

## (undated) DEVICE — GAUZE SPONGES,8 PLY: Brand: CURITY

## (undated) DEVICE — FOUR-WAY STOPCOCK WITH SPIN-LOCK®: Brand: DISCOFIX®

## (undated) DEVICE — NON-ADHERENT PAD PREPACK: Brand: TELFA

## (undated) DEVICE — LAPAROTOMY SPONGE - RF AND X-RAY DETECTABLE PRE-WASHED: Brand: SITUATE

## (undated) DEVICE — CANISTER SHELL LIPOSUCTION

## (undated) DEVICE — SUTURE VICRYL 3-0 SH

## (undated) DEVICE — GLOVE ORTHO ALOETOUCH SZ 6-1/2

## (undated) DEVICE — 3M(TM) TEGADERM(TM) TRANSPARENT FILM DRESSING FRAME STYLE 9505W: Brand: 3M™ TEGADERM™

## (undated) DEVICE — SOL  .9 1000ML BTL

## (undated) DEVICE — SUTURE MONOCRYL 4-0 P-3

## (undated) DEVICE — DERMABOND LIQUID ADHESIVE

## (undated) DEVICE — CG INFILTRATION TUBING: Brand: CG INFILTRATION TUBING

## (undated) DEVICE — CSTM UNIVERSAL DRAPE PK: Brand: MEDLINE INDUSTRIES, INC.

## (undated) DEVICE — REM POLYHESIVE ADULT PATIENT RETURN ELECTRODE: Brand: VALLEYLAB

## (undated) DEVICE — 3M™ IOBAN™ 2 ANTIMICROBIAL INCISE DRAPE 6648EZ: Brand: IOBAN™ 2

## (undated) DEVICE — LAMINECTOMY ARM CRADLE FOAM POSITIONER: Brand: CARDINAL HEALTH

## (undated) DEVICE — PLASTIC BREAST CDS-LF: Brand: MEDLINE INDUSTRIES, INC.

## (undated) DEVICE — MARKER SKIN 2 TIP

## (undated) DEVICE — SUPER SPONGES,MEDIUM: Brand: KERLIX

## (undated) DEVICE — OCCLUSIVE GAUZE STRIP OVERWRAP,3% BISMUTH TRIBROMOPHENATE IN PETROLATUM BLEND: Brand: XEROFORM

## (undated) DEVICE — SYRINGE 5ML LL TIP

## (undated) DEVICE — SYRINGE 60ML SLIP TIP

## (undated) DEVICE — DEVICE FAT TISSUE COLL REVOLVE

## (undated) DEVICE — 3M™ STERI-STRIP™ REINFORCED ADHESIVE SKIN CLOSURES, R1548, 1 IN X 5 IN (25 MM X 125 MM), 4 STRIPS/ENVELOPE: Brand: 3M™ STERI-STRIP™

## (undated) DEVICE — 3M™ IOBAN™ 2 ANTIMICROBIAL INCISE DRAPE 6650EZ: Brand: IOBAN™ 2

## (undated) DEVICE — BREAST-HERNIA-PORT CDS-LF: Brand: MEDLINE INDUSTRIES, INC.

## (undated) DEVICE — SUTURE VICRYL 2-0

## (undated) DEVICE — DRAPE HALF 40X58 DYNJP2410

## (undated) DEVICE — CAUTERY BLADE 2IN INS E1455

## (undated) DEVICE — VIOLET BRAIDED (POLYGLACTIN 910), SYNTHETIC ABSORBABLE SUTURE: Brand: COATED VICRYL

## (undated) DEVICE — CHLORAPREP 26ML APPLICATOR

## (undated) DEVICE — SUTURE PDS II 4-0 RB-1

## (undated) DEVICE — SUTURE MONOCRYL 4-0 PS-2

## (undated) DEVICE — SUTURE VLOC 180 2-0 12\" 0315

## (undated) DEVICE — Device: Brand: PLUMEPEN

## (undated) DEVICE — LAPAROTOMY CDS: Brand: MEDLINE INDUSTRIES, INC.

## (undated) DEVICE — SOL H2O 1000ML BTL

## (undated) DEVICE — GAUZE SPONGES,USP TYPE VII GAUZE, 12 PLY: Brand: CURITY

## (undated) DEVICE — DRAIN RELIAVAC 100CC

## (undated) DEVICE — PLASTC TOOMEY SYRNG DISP

## (undated) DEVICE — SOLUTION SET, MALE LUER LOCK ADAPTER

## (undated) DEVICE — PROXIMATE RH ROTATING HEAD SKIN STAPLERS (35 WIDE) CONTAINS 35 STAINLESS STEEL STAPLES: Brand: PROXIMATE

## (undated) DEVICE — DRESSING BIOPATCH 1X4 CNTR

## (undated) DEVICE — STERILE POLYISOPRENE POWDER-FREE SURGICAL GLOVES: Brand: PROTEXIS

## (undated) DEVICE — OINTMENT BACITRACIN PKT

## (undated) DEVICE — 3M(TM) MICROPORE TAPE DISPENSER 1535-2: Brand: 3M™ MICROPORE™

## (undated) DEVICE — DRESSING FOAM TOPIFOAM

## (undated) DEVICE — GOWN,SIRUS,FABRIC-REINFORCED,X-LARGE: Brand: MEDLINE

## (undated) DEVICE — SUTURE SILK 3-0 SH

## (undated) DEVICE — REDUCER FITTING (NON-STERILE) 7/8 IN (22MM) TO 1/4 IN (6.4MM) WITH 2 IN (5.1CM) CUFF

## (undated) DEVICE — UNDYED BRAIDED (POLYGLACTIN 910), SYNTHETIC ABSORBABLE SUTURE: Brand: COATED VICRYL

## (undated) DEVICE — TUBING LIPOSUCTION FLEX B

## (undated) DEVICE — BANDAGE ROLL,100% COTTON, 6 PLY, LARGE: Brand: KERLIX

## (undated) DEVICE — SUTURE MONOCRYL 3-0 PS-2

## (undated) DEVICE — SUTURE ETHILON 3-0 FSL

## (undated) DEVICE — SUTURE PROLENE 5-0 P-3

## (undated) DEVICE — SUTURE PROLENE 4-0 RB-1

## (undated) DEVICE — 3M™ STERI-STRIP™ REINFORCED ADHESIVE SKIN CLOSURES, R1547, 1/2 IN X 4 IN (12 MM X 100 MM), 6 STRIPS/ENVELOPE: Brand: 3M™ STERI-STRIP™

## (undated) DEVICE — PREMIUM WET SKIN PREP TRAY: Brand: MEDLINE INDUSTRIES, INC.

## (undated) DEVICE — GOWN,PREVENTION PLUS,LARGE,STERILE: Brand: MEDLINE

## (undated) DEVICE — #11 STERILE BLADE: Brand: POLYMER COATED BLADES

## (undated) DEVICE — PROXIMATE SKIN STAPLERS (35 WIDE) CONTAINS 35 STAINLESS STEEL STAPLES (FIXED HEAD): Brand: PROXIMATE

## (undated) DEVICE — SUTURE ETHIBOND 2-0 CT-2

## (undated) DEVICE — DRAIN BLAKE ROUND 15FR

## (undated) DEVICE — CLEAR MONOFILAMENT (POLYDIOXANONE), ABSORBABLE SURGICAL SUTURE: Brand: PDS

## (undated) DEVICE — SUTURE PLAIN GUT 5-0 PC-1

## (undated) DEVICE — SUTURE SILK 2-0

## (undated) DEVICE — BRA ZIPPERED SYTLE 958 X-LG

## (undated) DEVICE — VIAL LABORATORY SPY

## (undated) DEVICE — 1010 S-DRAPE TOWEL DRAPE 10/BX: Brand: STERI-DRAPE™

## (undated) DEVICE — LIGACLIP MCA MULTIPLE CLIP APPLIERS, 30 MEDIUM CLIPS: Brand: LIGACLIP

## (undated) DEVICE — SUTURE PDS II 2-0 SH

## (undated) DEVICE — EYE PADSSTERILENOT MADE WITH NATURAL RUBBER LATEXSINGLE USE ONLYDO NOT USE IF PACKAGE OPENED OR DAMAGED: Brand: CARDINAL HEALTH

## (undated) NOTE — MR AVS SNAPSHOT
After Visit Summary   1/12/2017    Christy Diana    MRN: [id number]           Diagnoses this Visit     Breast cancer of lower-inner quadrant of right female breast [facility])    -  Primary       Allergies     No Known Allergies      Your Vital Sign For medical emergencies, dial 911.

## (undated) NOTE — IP AVS SNAPSHOT
[facility] [place] [address] [place], [address] ~ [phone]                Discharge Summary   2/15/2017    Ky Sour           Admission Information        Provider Department    2/15/2017 Donald Dorman MD  [id number] Last time this was given:  88 mcg on 2/18/2017  9:57 AM   Commonly known as:  SYNTHROID, LEVOTHROID   What changed:  See the new instructions.         TAKE 1 TABLET BY MOUTH EVERY DAY    Renae Keene        Next dose due tomorrow                      CO - Cefadroxil 500 MG Caps  - oxyCODONE-acetaminophen 5-325 MG Tabs              Patient Instructions       Doyle Jean MD         PHONE: [phone] or [phone]     Breast Reconstruction with Flap    What will I see when I look into a mirror?   You wi your normal activities about four weeks after surgery. When can I drive? You should never drive if you are still taking any pain medication other than Tylenol. When will I be able to return to work?   If your job does not require heavy activity, you ирина laxative, [place] Milk of Magnesia, and fleet enemas (regular or oil retention) as a last resort. Anti-nausea medication: Some nausea is normal in the first 24-48 hours following surgery. If you are nausea, please take the anti-nausea medication.     Asp legs contact the office immediately. Breathing problems after surgery are rare but can be a serious complication.  If you develop any chest and/or back pain or the feeling of being short of breath you must contact my office or be seen in the nearest emerge 32.3 -- (02/16/17)  912.2 --      Metabolic Lab Results  (Last result in the past 90 days)    HgbA1C Glucose BUN Creatinine Calcium Alkaline Phosph AST    -- (02/16/17)  114 (H) (02/16/17)  17 (02/16/17)  0.84 (02/16/17)  8.6 -- --      Metabolic Lab Resul _____________________________________________________________________________    Medication Side Effects - Medications to be taken at home  As your caregivers, we want you to be aware of the medications you are prescribed to take and their potential SIDE E movement in 2+ days, diarrhea           Endocrine Medications     LEVOTHYROXINE SODIUM 88 MCG Oral Tab         Use: Regulate metabolism and inflammation   Most common side effects:  Dizziness, swelling, appetite changes, weight changes, changes in sleep an

## (undated) NOTE — ED AVS SNAPSHOT
Jacob Arizmendikimberlystefan   MRN: [id number]    Department:  [facility] Emergency Department   Date of Visit:  1/14/2019           Disclosure     Insurance plans vary and the physician(s) referred by the ER may not be covered by your plan.  Please contact tell this physician (or your personal doctor if your instructions are to return to your personal doctor) about any new or lasting problems. The primary care or specialist physician will see patients referred from the [facility] Emergency Department.  Dayna Payne